# Patient Record
Sex: FEMALE | Race: WHITE | NOT HISPANIC OR LATINO | Employment: FULL TIME | URBAN - METROPOLITAN AREA
[De-identification: names, ages, dates, MRNs, and addresses within clinical notes are randomized per-mention and may not be internally consistent; named-entity substitution may affect disease eponyms.]

---

## 2023-05-16 LAB
ALANINE AMINOTRANSFERASE (SGPT) (U/L) IN SER/PLAS: 26 U/L (ref 7–45)
ALBUMIN (G/DL) IN SER/PLAS: 4.1 G/DL (ref 3.4–5)
ALKALINE PHOSPHATASE (U/L) IN SER/PLAS: 87 U/L (ref 33–136)
ANION GAP IN SER/PLAS: 11 MMOL/L (ref 10–20)
ASPARTATE AMINOTRANSFERASE (SGOT) (U/L) IN SER/PLAS: 25 U/L (ref 9–39)
BILIRUBIN TOTAL (MG/DL) IN SER/PLAS: 0.5 MG/DL (ref 0–1.2)
CALCIUM (MG/DL) IN SER/PLAS: 8.9 MG/DL (ref 8.6–10.3)
CARBON DIOXIDE, TOTAL (MMOL/L) IN SER/PLAS: 29 MMOL/L (ref 21–32)
CHLORIDE (MMOL/L) IN SER/PLAS: 105 MMOL/L (ref 98–107)
CHOLESTEROL (MG/DL) IN SER/PLAS: 174 MG/DL (ref 0–199)
CHOLESTEROL IN HDL (MG/DL) IN SER/PLAS: 62.7 MG/DL
CHOLESTEROL/HDL RATIO: 2.8
CREATININE (MG/DL) IN SER/PLAS: 0.63 MG/DL (ref 0.5–1.05)
GFR FEMALE: >90 ML/MIN/1.73M2
GLUCOSE (MG/DL) IN SER/PLAS: 100 MG/DL (ref 74–99)
LDL: 82 MG/DL (ref 0–99)
POTASSIUM (MMOL/L) IN SER/PLAS: 4.6 MMOL/L (ref 3.5–5.3)
PROTEIN TOTAL: 6.8 G/DL (ref 6.4–8.2)
SODIUM (MMOL/L) IN SER/PLAS: 140 MMOL/L (ref 136–145)
THYROTROPIN (MIU/L) IN SER/PLAS BY DETECTION LIMIT <= 0.05 MIU/L: 2.73 MIU/L (ref 0.44–3.98)
TRIGLYCERIDE (MG/DL) IN SER/PLAS: 148 MG/DL (ref 0–149)
UREA NITROGEN (MG/DL) IN SER/PLAS: 8 MG/DL (ref 6–23)
VLDL: 30 MG/DL (ref 0–40)

## 2023-09-04 LAB — SARS-COV-2 RESULT: DETECTED

## 2023-10-17 PROBLEM — C50.119 MALIGNANT NEOPLASM OF CENTRAL PART OF FEMALE BREAST (MULTI): Status: ACTIVE | Noted: 2023-10-17

## 2023-10-17 PROBLEM — C50.911 BREAST CANCER, RIGHT BREAST (MULTI): Status: ACTIVE | Noted: 2023-10-17

## 2023-10-17 PROBLEM — Z98.890 S/P LYMPH NODE BIOPSY: Status: ACTIVE | Noted: 2023-10-17

## 2023-10-17 PROBLEM — R92.8 ABNORMAL FINDINGS ON DIAGNOSTIC IMAGING OF BREAST: Status: ACTIVE | Noted: 2023-10-17

## 2023-10-17 PROBLEM — Z90.11 HISTORY OF RIGHT TOTAL MASTECTOMY: Status: ACTIVE | Noted: 2023-10-17

## 2023-10-17 PROBLEM — D09.9 CARCINOMA IN SITU: Status: ACTIVE | Noted: 2023-10-17

## 2023-10-17 PROBLEM — N63.10 MASS OF MULTIPLE SITES OF RIGHT BREAST: Status: ACTIVE | Noted: 2023-10-17

## 2023-10-17 PROBLEM — N64.52 DISCHARGE FROM NIPPLE: Status: ACTIVE | Noted: 2023-10-17

## 2023-10-17 PROBLEM — I89.0 LYMPHEDEMA: Status: ACTIVE | Noted: 2023-10-17

## 2023-10-17 RX ORDER — CALCIUM CARBONATE/VITAMIN D3 600MG-5MCG
TABLET ORAL
COMMUNITY
Start: 2018-06-13

## 2023-10-17 RX ORDER — LEVOTHYROXINE SODIUM 100 UG/1
100 TABLET ORAL DAILY
COMMUNITY
Start: 2019-10-02 | End: 2024-01-29

## 2023-10-17 RX ORDER — MULTIVIT-MIN/VIT C/HERB NO.124 250-8.875
TABLET,CHEWABLE ORAL
COMMUNITY
Start: 2021-03-24

## 2023-10-17 RX ORDER — PHENOL 1.4 %
AEROSOL, SPRAY (ML) MUCOUS MEMBRANE
COMMUNITY
Start: 2018-06-13

## 2023-10-17 RX ORDER — FLUTICASONE PROPIONATE 50 MCG
1 SPRAY, SUSPENSION (ML) NASAL EVERY MORNING
COMMUNITY
Start: 2022-11-14

## 2023-10-17 RX ORDER — ATORVASTATIN CALCIUM 10 MG/1
10 TABLET, FILM COATED ORAL DAILY
COMMUNITY
Start: 2021-05-12 | End: 2023-10-18 | Stop reason: SDUPTHER

## 2023-10-17 RX ORDER — CHOLECALCIFEROL (VITAMIN D3) 125 MCG
125 CAPSULE ORAL DAILY
COMMUNITY

## 2023-10-17 RX ORDER — ANASTROZOLE 1 MG/1
1 TABLET ORAL DAILY
COMMUNITY
End: 2023-10-18 | Stop reason: ALTCHOICE

## 2023-10-18 ENCOUNTER — OFFICE VISIT (OUTPATIENT)
Dept: PRIMARY CARE | Facility: CLINIC | Age: 68
End: 2023-10-18
Payer: COMMERCIAL

## 2023-10-18 VITALS
SYSTOLIC BLOOD PRESSURE: 120 MMHG | OXYGEN SATURATION: 96 % | BODY MASS INDEX: 32.95 KG/M2 | HEART RATE: 68 BPM | DIASTOLIC BLOOD PRESSURE: 76 MMHG | WEIGHT: 193 LBS

## 2023-10-18 DIAGNOSIS — E66.09 CLASS 1 OBESITY DUE TO EXCESS CALORIES WITHOUT SERIOUS COMORBIDITY WITH BODY MASS INDEX (BMI) OF 32.0 TO 32.9 IN ADULT: ICD-10-CM

## 2023-10-18 DIAGNOSIS — E03.9 HYPOTHYROIDISM, UNSPECIFIED TYPE: ICD-10-CM

## 2023-10-18 DIAGNOSIS — E78.2 MIXED HYPERLIPIDEMIA: Primary | ICD-10-CM

## 2023-10-18 PROBLEM — E66.811 CLASS 1 OBESITY DUE TO EXCESS CALORIES WITHOUT SERIOUS COMORBIDITY WITH BODY MASS INDEX (BMI) OF 32.0 TO 32.9 IN ADULT: Status: ACTIVE | Noted: 2023-10-18

## 2023-10-18 PROCEDURE — 1159F MED LIST DOCD IN RCRD: CPT | Performed by: INTERNAL MEDICINE

## 2023-10-18 PROCEDURE — 99213 OFFICE O/P EST LOW 20 MIN: CPT | Performed by: INTERNAL MEDICINE

## 2023-10-18 PROCEDURE — 1036F TOBACCO NON-USER: CPT | Performed by: INTERNAL MEDICINE

## 2023-10-18 RX ORDER — ATORVASTATIN CALCIUM 10 MG/1
10 TABLET, FILM COATED ORAL DAILY
Qty: 90 TABLET | Refills: 1 | Status: SHIPPED | OUTPATIENT
Start: 2023-10-18 | End: 2024-05-20

## 2023-10-18 ASSESSMENT — PROMIS GLOBAL HEALTH SCALE
RATE_SOCIAL_SATISFACTION: VERY GOOD
RATE_PHYSICAL_HEALTH: VERY GOOD
RATE_QUALITY_OF_LIFE: VERY GOOD
RATE_GENERAL_HEALTH: VERY GOOD
EMOTIONAL_PROBLEMS: RARELY
CARRYOUT_SOCIAL_ACTIVITIES: EXCELLENT
RATE_AVERAGE_FATIGUE: MILD
CARRYOUT_PHYSICAL_ACTIVITIES: COMPLETELY
RATE_MENTAL_HEALTH: VERY GOOD
RATE_AVERAGE_PAIN: 2

## 2023-10-18 ASSESSMENT — ENCOUNTER SYMPTOMS
LOSS OF SENSATION IN FEET: 0
OCCASIONAL FEELINGS OF UNSTEADINESS: 0
DEPRESSION: 0

## 2023-10-18 NOTE — PROGRESS NOTES
Subjective   Patient ID: Harriet Roth is a 67 y.o. female who presents for Annual Exam.    HPI hyothryoid    Review of Systems    Objective   /76   Pulse 68   Wt 87.5 kg (193 lb)   SpO2 96%   BMI 32.95 kg/m²     Physical Exam  Card rrr  Pulm cta  Abd soft nt bs plus   Assessment/Plan   Diagnoses and all orders for this visit:  Mixed hyperlipidemia  -     atorvastatin (Lipitor) 10 mg tablet; Take 1 tablet (10 mg) by mouth once daily.  Hypothyroidism, unspecified type  -     atorvastatin (Lipitor) 10 mg tablet; Take 1 tablet (10 mg) by mouth once daily.  Class 1 obesity due to excess calories without serious comorbidity with body mass index (BMI) of 32.0 to 32.9 in adult

## 2023-11-20 ENCOUNTER — LAB (OUTPATIENT)
Dept: LAB | Facility: LAB | Age: 68
End: 2023-11-20
Payer: COMMERCIAL

## 2023-11-20 DIAGNOSIS — E03.9 HYPOTHYROIDISM, UNSPECIFIED TYPE: ICD-10-CM

## 2023-11-20 DIAGNOSIS — E78.2 MIXED HYPERLIPIDEMIA: ICD-10-CM

## 2023-11-20 DIAGNOSIS — E66.09 CLASS 1 OBESITY DUE TO EXCESS CALORIES WITHOUT SERIOUS COMORBIDITY WITH BODY MASS INDEX (BMI) OF 32.0 TO 32.9 IN ADULT: ICD-10-CM

## 2023-11-20 LAB
ALBUMIN SERPL BCP-MCNC: 4.2 G/DL (ref 3.4–5)
ALP SERPL-CCNC: 94 U/L (ref 33–136)
ALT SERPL W P-5'-P-CCNC: 23 U/L (ref 7–45)
ANION GAP SERPL CALC-SCNC: 10 MMOL/L (ref 10–20)
AST SERPL W P-5'-P-CCNC: 23 U/L (ref 9–39)
BILIRUB SERPL-MCNC: 0.5 MG/DL (ref 0–1.2)
BUN SERPL-MCNC: 8 MG/DL (ref 6–23)
CALCIUM SERPL-MCNC: 8.7 MG/DL (ref 8.6–10.3)
CHLORIDE SERPL-SCNC: 105 MMOL/L (ref 98–107)
CHOLEST SERPL-MCNC: 171 MG/DL (ref 0–199)
CHOLESTEROL/HDL RATIO: 2.9
CO2 SERPL-SCNC: 29 MMOL/L (ref 21–32)
CREAT SERPL-MCNC: 0.6 MG/DL (ref 0.5–1.05)
GFR SERPL CREATININE-BSD FRML MDRD: >90 ML/MIN/1.73M*2
GLUCOSE SERPL-MCNC: 97 MG/DL (ref 74–99)
HDLC SERPL-MCNC: 58.9 MG/DL
LDLC SERPL CALC-MCNC: 79 MG/DL
NON HDL CHOLESTEROL: 112 MG/DL (ref 0–149)
POTASSIUM SERPL-SCNC: 4.6 MMOL/L (ref 3.5–5.3)
PROT SERPL-MCNC: 6.7 G/DL (ref 6.4–8.2)
SODIUM SERPL-SCNC: 139 MMOL/L (ref 136–145)
TRIGL SERPL-MCNC: 168 MG/DL (ref 0–149)
TSH SERPL-ACNC: 1.43 MIU/L (ref 0.44–3.98)
VLDL: 34 MG/DL (ref 0–40)

## 2023-11-20 PROCEDURE — 84443 ASSAY THYROID STIM HORMONE: CPT

## 2023-11-20 PROCEDURE — 80053 COMPREHEN METABOLIC PANEL: CPT

## 2023-11-20 PROCEDURE — 36415 COLL VENOUS BLD VENIPUNCTURE: CPT

## 2023-11-20 PROCEDURE — 80061 LIPID PANEL: CPT

## 2024-01-29 DIAGNOSIS — E03.9 HYPOTHYROIDISM, UNSPECIFIED TYPE: Primary | ICD-10-CM

## 2024-01-29 RX ORDER — LEVOTHYROXINE SODIUM 100 UG/1
100 TABLET ORAL DAILY
Qty: 90 TABLET | Refills: 1 | Status: SHIPPED | OUTPATIENT
Start: 2024-01-29 | End: 2024-04-24 | Stop reason: SDUPTHER

## 2024-04-04 NOTE — PROGRESS NOTES
Harriet Roth female   1955 68 y.o.   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 2017 Betina Elizondo performed right simple mastectomy and sentinel lymph node biopsy. Final pathology revealed papillary carcinoma and ductal carcinoma in situ (DCIS), multiple foci of invasive carcinoma were arising in association with papillary carcinoma, total extent measuring 4cm, margins negative, sentinel lymph nodes, negative (0/6). Postoperative radiation was not recommended. Anti-HER2 therapy was not recommended based on HER2 3+ only present in the papillary component. 2017 Anastrazole was initiated under the direction of Amandeep Moreno with a five year plan. She completed in 2022 with five years of therapy. She presents today for annual mammogram and exam with no complaints. She denies any new masses or lumps. She is now working at [employer] since [employer] closed. She is retiring 2024. She is very excited. She is taking a trip to [place] with her family in 2024.  Stage [id number]      BREAST IMAGIN/3/2023 Left screening mammogram, indicates BI-RADS Category 1.      FEMALE HISTORY: menarche age 14, , first birth age 35,  x1 month, OCP's x 4 years, menopause age 56, no HRT, heterogeneously dense tissue     FAMILY CANCER HISTORY:   Father: Prostate cancer, 65      Surgical History  She has a past surgical history that includes Tonsillectomy (2019) and Other surgical history (2019).     Social History  She reports that she has never smoked. She has never used smokeless tobacco. She reports that she does not currently use alcohol. She reports that she does not use drugs.    Family History  Family  History   Problem Relation Name Age of Onset    Prostate cancer Father          Allergies  Patient has no known allergies.    Medications  Current Outpatient Medications   Medication Instructions    atorvastatin (LIPITOR) 10 mg, oral, Daily    calcium carbonate-vitamin D3 600 mg-5 mcg (200 unit) tablet     cholecalciferol (VITAMIN D-3) 125 mcg, oral, Daily    cyanocobalamin, vitamin B-12, (VITAMIN B-12 ORAL) oral    ELDERBERRY FRUIT ORAL oral    fluticasone (Flonase) 50 mcg/actuation nasal spray 1 spray, Each Nostril, Every morning    levothyroxine (SYNTHROID, LEVOXYL) 100 mcg, oral, Daily    multivit-min-vit C-herb no.124 (Airborne, ascorbic acid,) 250-8.875 mg tablet,chewable Airborne Oral Tablet Chewable   Refills: 0        Start : 24-Mar-2021   Active    multivitamin with minerals (Adults Multivitamin) tablet Multivitamin Adults Oral Tablet   Refills: 0        Start : 13-Jun-2018   Active    mv-min/vit C/glut/lysine/hb124 (IMMUNE SUPPORT ORAL) oral         REVIEW OF SYSTEMS    Constitutional:  Negative for appetite change, fatigue, fever and unexpected weight change.   HENT:  Negative for ear pain, hearing loss, nosebleeds, sore throat and trouble swallowing.    Eyes:  Negative for discharge, itching and visual disturbance.   Respiratory:  Negative for cough, chest tightness and shortness of breath.    Cardiovascular:  Negative for chest pain, palpitations and leg swelling.   Breast: as indicated in HPI  Gastrointestinal:  Negative for abdominal pain, constipation, diarrhea and nausea.   Endocrine: Negative for cold intolerance and heat intolerance.   Genitourinary:  Negative for dysuria, frequency, hematuria, pelvic pain and vaginal bleeding.   Musculoskeletal:  Negative for arthralgias, back pain, gait problem, joint swelling and myalgias.   Skin:  Negative for color change and rash.   Allergic/Immunologic: Negative for environmental allergies and food allergies.   Neurological:  Negative for dizziness,  tremors, speech difficulty, weakness, numbness and headaches.   Hematological:  Does not bruise/bleed easily.   Psychiatric/Behavioral:  Negative for agitation, dysphoric mood and sleep disturbance. The patient is not nervous/anxious.         Past Medical History  She has a past medical history of Elevated blood-pressure reading, without diagnosis of hypertension (12/04/2019), Inflammatory disease of cervix uteri (06/12/2019), Mild cervical dysplasia (12/04/2019), and Personal history of other endocrine, nutritional and metabolic disease (12/04/2019).     Physical Exam  Patient is alert and oriented x3 and in a relaxed and appropriate mood. Her gait is steady and hand grasps are equal. Sclera is clear. The right breast and nipple have been removed with a well-healed transverse mastectomy incision. The right axilla has a well-healed biopsy incision. The overlying tissue is soft without palpable abnormalities, discrete nodules or masses. The left breast tissue is soft without palpable abnormalities, discrete nodules or masses. The skin and left nipple appear normal. There is no cervical, supraclavicular or axillary lymphadenopathy. Heart rate and rhythm normal, S1 and S2 appreciated. The lungs are clear to auscultation bilaterally. Abdomen is soft and non-tender.       Physical Exam  Chest:              Last Recorded Vitals  Vitals:    04/15/24 1344   BP: 142/85   Pulse: 82       Relevant Results   Time was spent viewing digital images of the radiology testing with the patient. I explained the results in depth, along with suggested explanation for follow up recommendations based on the testing results. BI-RADS Category 1    Imaging    Narrative & Impression   Interpreted By:  Gigi Jenkins,   STUDY:  BI MAMMO LEFT DIAGNOSTIC TOMOSYNTHESIS;  4/15/2024 1:49 pm      ACCESSION NUMBER(S):  [id number]      ORDERING CLINICIAN:  EDUARDO NAIK      INDICATION:  Annual left screening mammogram. History of right mastectomy.       COMPARISON:  04/03/2023, 03/29/2022, 03/24/2021      FINDINGS:  2D and tomosynthesis images were reviewed at 1 mm slice thickness.      Density:  The breast tissue is heterogeneously dense, which may  obscure small masses.      Stable benign asymmetry in the central left breast at middle depth on  CC view. No suspicious masses or calcifications are identified in the  left breast.      IMPRESSION:  No mammographic evidence of malignancy in the left breast.      BI-RADS CATEGORY:      BI-RADS Category:  1 Negative.  Recommendation:  Annual Screening.  Recommended Date:  1 Year.  Laterality:  Left.     Assessment/Plan   Normal clinical exam and imaging, history of right invasive cancer, no evidence of recurrence, s/p right mastectomy without reconstruction, Anastrazole endocrine therapy completed, no family history of breast cancer, heterogeneously dense tissue     Plan: Return in one year for left screening mammogram and office visit. Offered prescription for breast prosthesis and bra, declines.     Patient Discussion/Summary  Your clinical examination and imaging are normal. Please return in one year for left screening mammogram and office visit or sooner if you have any problems or concerns.     You can see your health information, review clinical summaries from office visits & test results online when you follow your health with MY  Chart, a personal health record. To sign up go to [email or web address]. If you need assistance with signing up or trouble getting into your account call [facility] Patient Line 24/7 at [phone].    My office phone number is [phone] if you need to get in touch with me or have additional questions or concerns. Thank you for choosing [facility] and trusting me as your healthcare provider. I look forward to seeing you again at your next office visit. I am honored to be a provider on your health care team and I remain dedicated to helping you achieve your health  goals.      Amara Worley, APRN-CNP

## 2024-04-08 ENCOUNTER — APPOINTMENT (OUTPATIENT)
Dept: RADIOLOGY | Facility: CLINIC | Age: 69
End: 2024-04-08
Payer: COMMERCIAL

## 2024-04-08 ENCOUNTER — APPOINTMENT (OUTPATIENT)
Dept: SURGICAL ONCOLOGY | Facility: CLINIC | Age: 69
End: 2024-04-08
Payer: COMMERCIAL

## 2024-04-15 ENCOUNTER — OFFICE VISIT (OUTPATIENT)
Dept: SURGICAL ONCOLOGY | Facility: CLINIC | Age: 69
End: 2024-04-15
Payer: COMMERCIAL

## 2024-04-15 ENCOUNTER — HOSPITAL ENCOUNTER (OUTPATIENT)
Dept: RADIOLOGY | Facility: CLINIC | Age: 69
Discharge: HOME | End: 2024-04-15
Payer: COMMERCIAL

## 2024-04-15 VITALS — WEIGHT: 192.9 LBS | HEIGHT: 64 IN | BODY MASS INDEX: 32.93 KG/M2

## 2024-04-15 VITALS
HEIGHT: 64 IN | SYSTOLIC BLOOD PRESSURE: 142 MMHG | HEART RATE: 82 BPM | DIASTOLIC BLOOD PRESSURE: 85 MMHG | WEIGHT: 196.8 LBS | BODY MASS INDEX: 33.6 KG/M2

## 2024-04-15 DIAGNOSIS — Z85.3 ENCOUNTER FOR FOLLOW-UP SURVEILLANCE OF BREAST CANCER: Primary | ICD-10-CM

## 2024-04-15 DIAGNOSIS — R92.8 OTHER ABNORMAL AND INCONCLUSIVE FINDINGS ON DIAGNOSTIC IMAGING OF BREAST: ICD-10-CM

## 2024-04-15 DIAGNOSIS — Z08 ENCOUNTER FOR FOLLOW-UP SURVEILLANCE OF BREAST CANCER: Primary | ICD-10-CM

## 2024-04-15 PROCEDURE — G0279 TOMOSYNTHESIS, MAMMO: HCPCS | Mod: LEFT SIDE | Performed by: STUDENT IN AN ORGANIZED HEALTH CARE EDUCATION/TRAINING PROGRAM

## 2024-04-15 PROCEDURE — 99213 OFFICE O/P EST LOW 20 MIN: CPT | Performed by: NURSE PRACTITIONER

## 2024-04-15 PROCEDURE — 77065 DX MAMMO INCL CAD UNI: CPT | Mod: LEFT SIDE | Performed by: STUDENT IN AN ORGANIZED HEALTH CARE EDUCATION/TRAINING PROGRAM

## 2024-04-15 PROCEDURE — 1126F AMNT PAIN NOTED NONE PRSNT: CPT | Performed by: NURSE PRACTITIONER

## 2024-04-15 PROCEDURE — 1159F MED LIST DOCD IN RCRD: CPT | Performed by: NURSE PRACTITIONER

## 2024-04-15 PROCEDURE — 77061 BREAST TOMOSYNTHESIS UNI: CPT | Mod: LT

## 2024-04-15 PROCEDURE — 3008F BODY MASS INDEX DOCD: CPT | Performed by: NURSE PRACTITIONER

## 2024-04-15 PROCEDURE — 1036F TOBACCO NON-USER: CPT | Performed by: NURSE PRACTITIONER

## 2024-04-15 ASSESSMENT — PAIN SCALES - GENERAL: PAINLEVEL: 0-NO PAIN

## 2024-04-15 NOTE — PATIENT INSTRUCTIONS
Your clinical examination and imaging are normal. Please return in one year for left screening mammogram and office visit or sooner if you have any problems or concerns.     You can see your health information, review clinical summaries from office visits & test results online when you follow your health with MY  Chart, a personal health record. To sign up go to [email or web address]. If you need assistance with signing up or trouble getting into your account call [facility] Patient Line 24/7 at [phone].    My office phone number is [phone] if you need to get in touch with me or have additional questions or concerns. Thank you for choosing [facility] and trusting me as your healthcare provider. I look forward to seeing you again at your next office visit. I am honored to be a provider on your health care team and I remain dedicated to helping you achieve your health goals.

## 2024-04-23 ASSESSMENT — PROMIS GLOBAL HEALTH SCALE
CARRYOUT_SOCIAL_ACTIVITIES: EXCELLENT
RATE_PHYSICAL_HEALTH: GOOD
CARRYOUT_PHYSICAL_ACTIVITIES: COMPLETELY
RATE_MENTAL_HEALTH: VERY GOOD
RATE_SOCIAL_SATISFACTION: GOOD
EMOTIONAL_PROBLEMS: RARELY
RATE_QUALITY_OF_LIFE: VERY GOOD
RATE_GENERAL_HEALTH: GOOD
RATE_AVERAGE_PAIN: 2

## 2024-04-24 ENCOUNTER — OFFICE VISIT (OUTPATIENT)
Dept: PRIMARY CARE | Facility: CLINIC | Age: 69
End: 2024-04-24
Payer: COMMERCIAL

## 2024-04-24 VITALS
WEIGHT: 198.2 LBS | OXYGEN SATURATION: 96 % | HEIGHT: 64 IN | TEMPERATURE: 97.3 F | DIASTOLIC BLOOD PRESSURE: 62 MMHG | HEART RATE: 84 BPM | BODY MASS INDEX: 33.84 KG/M2 | SYSTOLIC BLOOD PRESSURE: 120 MMHG

## 2024-04-24 DIAGNOSIS — E66.09 CLASS 1 OBESITY DUE TO EXCESS CALORIES WITHOUT SERIOUS COMORBIDITY WITH BODY MASS INDEX (BMI) OF 32.0 TO 32.9 IN ADULT: ICD-10-CM

## 2024-04-24 DIAGNOSIS — Z78.0 MENOPAUSE: Primary | ICD-10-CM

## 2024-04-24 DIAGNOSIS — Z00.00 WELLNESS EXAMINATION: ICD-10-CM

## 2024-04-24 DIAGNOSIS — C50.911 MALIGNANT NEOPLASM OF RIGHT FEMALE BREAST, UNSPECIFIED ESTROGEN RECEPTOR STATUS, UNSPECIFIED SITE OF BREAST (MULTI): ICD-10-CM

## 2024-04-24 DIAGNOSIS — I89.0 LYMPHEDEMA: ICD-10-CM

## 2024-04-24 DIAGNOSIS — E03.9 HYPOTHYROIDISM, UNSPECIFIED TYPE: ICD-10-CM

## 2024-04-24 PROCEDURE — 1036F TOBACCO NON-USER: CPT | Performed by: INTERNAL MEDICINE

## 2024-04-24 PROCEDURE — 1160F RVW MEDS BY RX/DR IN RCRD: CPT | Performed by: INTERNAL MEDICINE

## 2024-04-24 PROCEDURE — 99215 OFFICE O/P EST HI 40 MIN: CPT | Performed by: INTERNAL MEDICINE

## 2024-04-24 PROCEDURE — 1159F MED LIST DOCD IN RCRD: CPT | Performed by: INTERNAL MEDICINE

## 2024-04-24 PROCEDURE — 3008F BODY MASS INDEX DOCD: CPT | Performed by: INTERNAL MEDICINE

## 2024-04-24 RX ORDER — LEVOTHYROXINE SODIUM 100 UG/1
100 TABLET ORAL DAILY
Qty: 90 TABLET | Refills: 1 | Status: SHIPPED | OUTPATIENT
Start: 2024-04-24

## 2024-04-24 ASSESSMENT — ENCOUNTER SYMPTOMS
ARTHRALGIAS: 0
EYE REDNESS: 0
WHEEZING: 0
APPETITE CHANGE: 0
COUGH: 0
STRIDOR: 0
FACIAL ASYMMETRY: 0
EYE PAIN: 0
FATIGUE: 0
CHILLS: 0
PHOTOPHOBIA: 0
SHORTNESS OF BREATH: 0
ACTIVITY CHANGE: 0
CHOKING: 0
DIAPHORESIS: 0
CHEST TIGHTNESS: 0
LIGHT-HEADEDNESS: 0
HEMATOLOGIC/LYMPHATIC NEGATIVE: 1
ENDOCRINE NEGATIVE: 1
HEADACHES: 0
NUMBNESS: 0
UNEXPECTED WEIGHT CHANGE: 0
PALPITATIONS: 0
PSYCHIATRIC NEGATIVE: 1
ALLERGIC/IMMUNOLOGIC NEGATIVE: 1
EYE DISCHARGE: 0
EYE ITCHING: 0
DIZZINESS: 0
APNEA: 0
GASTROINTESTINAL NEGATIVE: 1
FEVER: 0

## 2024-04-24 NOTE — PROGRESS NOTES
"Subjective   Patient ID: Harriet Roth is a 68 y.o. female who presents for Follow-up (6 months).    HPI doing well   Feels well  Health is ok   Some fatigue   Diet is good   No other issues noted   Breathing is good     Colon  ? 2025 next   Mamm 2024   Dexa   due   Pap due     Review of Systems   Constitutional:  Negative for activity change, appetite change, chills, diaphoresis, fatigue, fever and unexpected weight change.   HENT: Negative.     Eyes:  Negative for photophobia, pain, discharge, redness, itching and visual disturbance.   Respiratory:  Negative for apnea, cough, choking, chest tightness, shortness of breath, wheezing and stridor.    Cardiovascular:  Negative for chest pain, palpitations and leg swelling.   Gastrointestinal: Negative.    Endocrine: Negative.    Genitourinary: Negative.    Musculoskeletal:  Negative for arthralgias.   Skin: Negative.    Allergic/Immunologic: Negative.    Neurological:  Negative for dizziness, facial asymmetry, light-headedness, numbness and headaches.   Hematological: Negative.    Psychiatric/Behavioral: Negative.         Objective   /62 (BP Location: Left arm, Patient Position: Sitting, BP Cuff Size: Adult)   Pulse 84   Temp 36.3 °C (97.3 °F) (Temporal)   Ht 1.613 m (5' 3.5\")   Wt 89.9 kg (198 lb 3.2 oz)   SpO2 96%   BMI 34.55 kg/m²     Physical Exam  HENT:      Head: Normocephalic.      Right Ear: Tympanic membrane normal.      Nose: Nose normal.      Mouth/Throat:      Mouth: Mucous membranes are moist.   Eyes:      Pupils: Pupils are equal, round, and reactive to light.   Cardiovascular:      Rate and Rhythm: Normal rate and regular rhythm.   Pulmonary:      Effort: Pulmonary effort is normal.   Abdominal:      General: Abdomen is flat. Bowel sounds are normal.   Musculoskeletal:         General: Normal range of motion.   Skin:     General: Skin is warm.      Capillary Refill: Capillary refill takes less than 2 seconds.   Neurological:      General: No " focal deficit present.      Mental Status: She is alert.   Psychiatric:         Behavior: Behavior normal.       Assessment/Plan   Diagnoses and all orders for this visit:  Menopause  -     XR DEXA bone density; Future  -     Urinalysis with Reflex Culture and Microscopic; Standing  Wellness examination  -     CBC; Future  -     Lipid Panel; Future  -     Comprehensive Metabolic Panel; Future  -     Urinalysis with Reflex Culture and Microscopic; Standing  Hypothyroidism, unspecified type  Comments:  good on meds  Orders:  -     Urinalysis with Reflex Culture and Microscopic; Standing  -     TSH with reflex to Free T4 if abnormal; Future  -     levothyroxine (Synthroid, Levoxyl) 100 mcg tablet; Take 1 tablet (100 mcg) by mouth once daily.  Class 1 obesity due to excess calories without serious comorbidity with body mass index (BMI) of 32.0 to 32.9 in adult  Comments:  trying to diet  Lymphedema  Malignant neoplasm of right female breast, unspecified estrogen receptor status, unspecified site of breast (Multi)  Comments:  all stable  Other orders  -     Follow Up In Primary Care - Established

## 2024-05-10 ENCOUNTER — LAB (OUTPATIENT)
Dept: LAB | Facility: LAB | Age: 69
End: 2024-05-10
Payer: COMMERCIAL

## 2024-05-10 DIAGNOSIS — Z78.0 MENOPAUSE: ICD-10-CM

## 2024-05-10 DIAGNOSIS — E03.9 HYPOTHYROIDISM, UNSPECIFIED TYPE: ICD-10-CM

## 2024-05-10 DIAGNOSIS — Z00.00 WELLNESS EXAMINATION: ICD-10-CM

## 2024-05-10 LAB
ALBUMIN SERPL BCP-MCNC: 4.3 G/DL (ref 3.4–5)
ALP SERPL-CCNC: 85 U/L (ref 33–136)
ALT SERPL W P-5'-P-CCNC: 26 U/L (ref 7–45)
ANION GAP SERPL CALC-SCNC: 8 MMOL/L (ref 10–20)
APPEARANCE UR: CLEAR
AST SERPL W P-5'-P-CCNC: 22 U/L (ref 9–39)
BILIRUB SERPL-MCNC: 0.4 MG/DL (ref 0–1.2)
BILIRUB UR STRIP.AUTO-MCNC: NEGATIVE MG/DL
BUN SERPL-MCNC: 8 MG/DL (ref 6–23)
CALCIUM SERPL-MCNC: 8.9 MG/DL (ref 8.6–10.3)
CHLORIDE SERPL-SCNC: 104 MMOL/L (ref 98–107)
CHOLEST SERPL-MCNC: 177 MG/DL (ref 0–199)
CHOLESTEROL/HDL RATIO: 2.9
CO2 SERPL-SCNC: 30 MMOL/L (ref 21–32)
COLOR UR: NORMAL
CREAT SERPL-MCNC: 0.61 MG/DL (ref 0.5–1.05)
EGFRCR SERPLBLD CKD-EPI 2021: >90 ML/MIN/1.73M*2
ERYTHROCYTE [DISTWIDTH] IN BLOOD BY AUTOMATED COUNT: 12.1 % (ref 11.5–14.5)
GLUCOSE SERPL-MCNC: 104 MG/DL (ref 74–99)
GLUCOSE UR STRIP.AUTO-MCNC: NORMAL MG/DL
HCT VFR BLD AUTO: 44 % (ref 36–46)
HDLC SERPL-MCNC: 60.1 MG/DL
HGB BLD-MCNC: 14.3 G/DL (ref 12–16)
HOLD SPECIMEN: NORMAL
KETONES UR STRIP.AUTO-MCNC: NEGATIVE MG/DL
LDLC SERPL CALC-MCNC: 85 MG/DL
LEUKOCYTE ESTERASE UR QL STRIP.AUTO: NEGATIVE
MCH RBC QN AUTO: 29.9 PG (ref 26–34)
MCHC RBC AUTO-ENTMCNC: 32.5 G/DL (ref 32–36)
MCV RBC AUTO: 92 FL (ref 80–100)
NITRITE UR QL STRIP.AUTO: NEGATIVE
NON HDL CHOLESTEROL: 117 MG/DL (ref 0–149)
NRBC BLD-RTO: 0 /100 WBCS (ref 0–0)
PH UR STRIP.AUTO: 7.5 [PH]
PLATELET # BLD AUTO: 265 X10*3/UL (ref 150–450)
POTASSIUM SERPL-SCNC: 4.3 MMOL/L (ref 3.5–5.3)
PROT SERPL-MCNC: 6.9 G/DL (ref 6.4–8.2)
PROT UR STRIP.AUTO-MCNC: NEGATIVE MG/DL
RBC # BLD AUTO: 4.79 X10*6/UL (ref 4–5.2)
RBC # UR STRIP.AUTO: NEGATIVE /UL
SODIUM SERPL-SCNC: 138 MMOL/L (ref 136–145)
SP GR UR STRIP.AUTO: 1.01
TRIGL SERPL-MCNC: 162 MG/DL (ref 0–149)
TSH SERPL-ACNC: 3.4 MIU/L (ref 0.44–3.98)
UROBILINOGEN UR STRIP.AUTO-MCNC: NORMAL MG/DL
VLDL: 32 MG/DL (ref 0–40)
WBC # BLD AUTO: 4.9 X10*3/UL (ref 4.4–11.3)

## 2024-05-10 PROCEDURE — 85027 COMPLETE CBC AUTOMATED: CPT

## 2024-05-10 PROCEDURE — 80053 COMPREHEN METABOLIC PANEL: CPT

## 2024-05-10 PROCEDURE — 81003 URINALYSIS AUTO W/O SCOPE: CPT

## 2024-05-10 PROCEDURE — 36415 COLL VENOUS BLD VENIPUNCTURE: CPT

## 2024-05-10 PROCEDURE — 80061 LIPID PANEL: CPT

## 2024-05-10 PROCEDURE — 84443 ASSAY THYROID STIM HORMONE: CPT

## 2024-05-20 DIAGNOSIS — E78.2 MIXED HYPERLIPIDEMIA: ICD-10-CM

## 2024-05-20 DIAGNOSIS — E03.9 HYPOTHYROIDISM, UNSPECIFIED TYPE: ICD-10-CM

## 2024-05-20 RX ORDER — ATORVASTATIN CALCIUM 10 MG/1
10 TABLET, FILM COATED ORAL DAILY
Qty: 90 TABLET | Refills: 1 | Status: SHIPPED | OUTPATIENT
Start: 2024-05-20

## 2024-05-31 ENCOUNTER — PROCEDURE VISIT (OUTPATIENT)
Dept: PRIMARY CARE | Facility: CLINIC | Age: 69
End: 2024-05-31
Payer: COMMERCIAL

## 2024-05-31 VITALS
WEIGHT: 198 LBS | BODY MASS INDEX: 33.8 KG/M2 | DIASTOLIC BLOOD PRESSURE: 88 MMHG | RESPIRATION RATE: 16 BRPM | HEART RATE: 77 BPM | HEIGHT: 64 IN | SYSTOLIC BLOOD PRESSURE: 149 MMHG

## 2024-05-31 DIAGNOSIS — Z01.419 WELL WOMAN EXAM: ICD-10-CM

## 2024-05-31 DIAGNOSIS — N84.1 CERVICAL POLYP: Primary | ICD-10-CM

## 2024-05-31 DIAGNOSIS — C50.911 MALIGNANT NEOPLASM OF RIGHT FEMALE BREAST, UNSPECIFIED ESTROGEN RECEPTOR STATUS, UNSPECIFIED SITE OF BREAST (MULTI): ICD-10-CM

## 2024-05-31 PROCEDURE — 99214 OFFICE O/P EST MOD 30 MIN: CPT | Performed by: NURSE PRACTITIONER

## 2024-05-31 NOTE — PROGRESS NOTES
"Primary Care Physician: Amandeep Barrera MD    Date of Visit: 05/31/2024     Chief Complaint:   Harriet Roth is here for her gyn wellness exam.     Hx:  Kids 2 - vaginally, no grandkids  - not active   Lifetime partner -  1   Colonoscopy - every 2 yrs for polyps with dr hodge    No surgeries  Last period/menopause 50's no bleeding since  Has a breast health specialist APN at  [facility] she does her q6mo breast exams and orders  her mammos for her and reviews.      Sbe all the time she reports   Breast cancer, mom had partial hysterectomy ? Reason     Past Medical History:  Past Medical History:   Diagnosis Date    Elevated blood-pressure reading, without diagnosis of hypertension 12/04/2019    Borderline hypertension    Inflammatory disease of cervix uteri 06/12/2019    Chronic cervicitis    Mild cervical dysplasia 12/04/2019    Mild cervical dysplasia    Personal history of other endocrine, nutritional and metabolic disease 12/04/2019    History of hypothyroidism        Social History:   reports that she has never smoked. She has never used smokeless tobacco. She reports that she does not currently use alcohol. She reports that she does not use drugs.     Surgical History:  Past Surgical History:   Procedure Laterality Date    OTHER SURGICAL HISTORY  12/04/2019    Right mastectomy    TONSILLECTOMY  12/04/2019    Tonsillectomy        Family History:  family history includes HYSTERECTOMY PARTIAL in her mother; Prostate cancer in her father.      HPI:  HPI  above  ROS:   Review of Systems   neg  Vitals:  /88   Pulse 77   Resp 16   Ht 1.613 m (5' 3.5\")   Wt 89.8 kg (198 lb)   BMI 34.52 kg/m²   Nerves last b/p with pcp normal    GYN Physical Exam:  Physical Exam  Constitutional:       General: She is awake.      Appearance: Normal appearance.   Genitourinary:      Rectum and urethral meatus normal.      Genitourinary Comments: Pt has a cervical polyp covering 1/4 her cervical os.  We will refer to " gyn for eval/removal.       Right Labia: No rash, tenderness, lesions or skin changes.     Left Labia: No tenderness, lesions, skin changes or rash.     No vaginal discharge, erythema, tenderness, bleeding, ulceration or granulation tissue.        Right Adnexa: not tender, not full and no mass present.     Left Adnexa: not tender, not full and no mass present.     Cervical friability and polyp present.            No uterine mass detected.  Neurological:      Mental Status: She is alert.   Psychiatric:         Behavior: Behavior is cooperative.   Vitals and nursing note reviewed.         Has a breast health provider so no breast exam was done today      Assessment/Plan   Problem List Items Addressed This Visit       Breast cancer, right breast (Multi)     Other Visit Diagnoses       Cervical polyp    -  Primary    Relevant Orders    Referral to Gynecology    Well woman exam        Relevant Orders    THINPREP PAP TEST              Laura L Seaver, APRN-CNP

## 2024-06-10 ENCOUNTER — OFFICE VISIT (OUTPATIENT)
Dept: OBSTETRICS AND GYNECOLOGY | Facility: CLINIC | Age: 69
End: 2024-06-10
Payer: MEDICARE

## 2024-06-10 VITALS
WEIGHT: 196 LBS | HEIGHT: 64 IN | BODY MASS INDEX: 33.46 KG/M2 | DIASTOLIC BLOOD PRESSURE: 80 MMHG | SYSTOLIC BLOOD PRESSURE: 140 MMHG

## 2024-06-10 DIAGNOSIS — N84.1 CERVICAL POLYP: ICD-10-CM

## 2024-06-10 PROCEDURE — 3008F BODY MASS INDEX DOCD: CPT | Performed by: OBSTETRICS & GYNECOLOGY

## 2024-06-10 PROCEDURE — 1036F TOBACCO NON-USER: CPT | Performed by: OBSTETRICS & GYNECOLOGY

## 2024-06-10 PROCEDURE — 1159F MED LIST DOCD IN RCRD: CPT | Performed by: OBSTETRICS & GYNECOLOGY

## 2024-06-10 PROCEDURE — 1160F RVW MEDS BY RX/DR IN RCRD: CPT | Performed by: OBSTETRICS & GYNECOLOGY

## 2024-06-10 PROCEDURE — 99203 OFFICE O/P NEW LOW 30 MIN: CPT | Performed by: OBSTETRICS & GYNECOLOGY

## 2024-06-10 NOTE — ASSESSMENT & PLAN NOTE
-- CERVICAL POLYP: Patient referred for cervical polyp.  On exam a mildly irregular cervical canal was noted however no cervical polyp was visualized.  Patient is menopausal and denies any PMB.  Patient had Pap performed May 29 with results pending.  Discussed with patient normal exam.  Would recommend observation, and if abnormal Pap patient will call for a colposcopy.  -- History of BREAST CANCER: In 2017, in remission, normal mammogram April 2024  --Pap from May 2024 is pending  --Patient is P2    PLAN:  Follow-up if any problems or abnormal Pap

## 2024-06-10 NOTE — PROGRESS NOTES
Subjective   Patient ID: Harriet Roth is a 68 y.o. female who presents for Referral (Referral from PCP,CNP.  Cervical polyp.).  HPI  68-year-old referred for possible cervical polyp.  Patient denies any postmenopausal bleeding.  She had a Pap performed May 2019 and is pending.  Patient denies any GYN concerns.        Objective   Physical Exam  Exam conducted with a chaperone present.   Constitutional:       Appearance: Normal appearance.   Abdominal:      Palpations: Abdomen is soft. There is no mass.      Tenderness: There is no abdominal tenderness.   Genitourinary:     General: Normal vulva.      Vagina: Normal. No lesions.      Cervix: No lesion.   Neurological:      Mental Status: She is alert.   Psychiatric:         Mood and Affect: Mood normal.         Behavior: Behavior normal.         Assessment/Plan   Problem List Items Addressed This Visit             ICD-10-CM    Cervical polyp N84.1     -- CERVICAL POLYP: Patient referred for cervical polyp.  On exam a mildly irregular cervical canal was noted however no cervical polyp was visualized.  Patient is menopausal and denies any PMB.  Patient had Pap performed May 29 with results pending.  Discussed with patient normal exam.  Would recommend observation, and if abnormal Pap patient will call for a colposcopy.  -- History of BREAST CANCER: In 2017, in remission, normal mammogram April 2024  --Pap from May 2024 is pending  --Patient is P2    PLAN:  Follow-up if any problems or abnormal Pap

## 2024-06-13 LAB
CYTOLOGY CMNT CVX/VAG CYTO-IMP: NORMAL
LAB AP HPV GENOTYPE QUESTION: NO
LAB AP HPV HR: NORMAL
LABORATORY COMMENT REPORT: NORMAL
MENSTRUAL HX REPORTED: NORMAL
PATH REPORT.TOTAL CANCER: NORMAL

## 2024-07-18 ENCOUNTER — OFFICE VISIT (OUTPATIENT)
Dept: PRIMARY CARE | Facility: CLINIC | Age: 69
End: 2024-07-18
Payer: MEDICARE

## 2024-07-18 VITALS
BODY MASS INDEX: 33.97 KG/M2 | HEART RATE: 100 BPM | OXYGEN SATURATION: 97 % | DIASTOLIC BLOOD PRESSURE: 80 MMHG | WEIGHT: 199 LBS | HEIGHT: 64 IN | SYSTOLIC BLOOD PRESSURE: 142 MMHG

## 2024-07-18 DIAGNOSIS — L23.7 POISON IVY DERMATITIS: Primary | ICD-10-CM

## 2024-07-18 PROCEDURE — 99213 OFFICE O/P EST LOW 20 MIN: CPT | Performed by: INTERNAL MEDICINE

## 2024-07-18 PROCEDURE — 1036F TOBACCO NON-USER: CPT | Performed by: INTERNAL MEDICINE

## 2024-07-18 PROCEDURE — 1159F MED LIST DOCD IN RCRD: CPT | Performed by: INTERNAL MEDICINE

## 2024-07-18 PROCEDURE — 3008F BODY MASS INDEX DOCD: CPT | Performed by: INTERNAL MEDICINE

## 2024-07-18 RX ORDER — TRIAMCINOLONE ACETONIDE 40 MG/ML
40 INJECTION, SUSPENSION INTRA-ARTICULAR; INTRAMUSCULAR ONCE
Status: SHIPPED | OUTPATIENT
Start: 2024-07-18

## 2024-07-18 RX ORDER — METHYLPREDNISOLONE 4 MG/1
TABLET ORAL
Qty: 21 TABLET | Refills: 0 | Status: SHIPPED | OUTPATIENT
Start: 2024-07-18 | End: 2024-07-25

## 2024-07-18 ASSESSMENT — ENCOUNTER SYMPTOMS
ALLERGIC REACTION: 1
COUGH: 1

## 2024-07-18 NOTE — PROGRESS NOTES
"Subjective   Patient ID: Harriet Roth is a 68 y.o. female who presents for Allergic Reaction.    Allergic Reaction  Associated symptoms include coughing and a rash.   Rash  This is a new problem. The current episode started in the past 7 days. The problem has been gradually worsening since onset. The affected locations include the face, neck, chest, back and abdomen. It is unknown if there was an exposure to a precipitant. Associated symptoms include coughing and facial edema.      She remembers working out in yard over weekend, developed diffuse itchy rash over neck, torso and both upper extremities , and also swelling around right eye. She denies fever, chills, lip or tongue swelling, dyspnea or wheezing.  She has treid benadryl tab and HC cream.  No known environmental allergy , chemical , food allergy, no new med/otc or antibiotic  taken recently.    Review of Systems   Respiratory:  Positive for cough.    Skin:  Positive for rash.     See HPI    Objective   /80 (BP Location: Left arm, Patient Position: Sitting)   Pulse 100   Ht 1.626 m (5' 4\")   Wt 90.3 kg (199 lb)   SpO2 97%   BMI 34.16 kg/m²     Physical Exam  Constitutional:       Appearance: She is obese.   Skin:     General: Skin is warm and dry.      Findings: Rash present.      Comments: Diffuse pinkish erythematous rash over neck , groin, abdomen, back and both upper extremities , and right eyelid swelling   Neurological:      Mental Status: She is alert.         Assessment/Plan        Poison Ivy contact dermatitis : diffuse  Kenalog 40 mg IM  Medrol dosepak from tomorrow  Hydration, tylenol  HC cream, Benadryl tab  Contact precautions    "

## 2024-09-18 ENCOUNTER — APPOINTMENT (OUTPATIENT)
Dept: HEMATOLOGY/ONCOLOGY | Facility: CLINIC | Age: 69
End: 2024-09-18
Payer: COMMERCIAL

## 2024-09-18 ENCOUNTER — OFFICE VISIT (OUTPATIENT)
Dept: HEMATOLOGY/ONCOLOGY | Facility: CLINIC | Age: 69
End: 2024-09-18
Payer: MEDICARE

## 2024-09-18 VITALS
SYSTOLIC BLOOD PRESSURE: 131 MMHG | WEIGHT: 200.84 LBS | HEART RATE: 78 BPM | BODY MASS INDEX: 34.47 KG/M2 | DIASTOLIC BLOOD PRESSURE: 82 MMHG

## 2024-09-18 DIAGNOSIS — Z90.11 HISTORY OF RIGHT TOTAL MASTECTOMY: ICD-10-CM

## 2024-09-18 DIAGNOSIS — C50.911 MALIGNANT NEOPLASM OF RIGHT FEMALE BREAST, UNSPECIFIED ESTROGEN RECEPTOR STATUS, UNSPECIFIED SITE OF BREAST: ICD-10-CM

## 2024-09-18 DIAGNOSIS — Z85.3 HISTORY OF RIGHT BREAST CANCER: Primary | ICD-10-CM

## 2024-09-18 DIAGNOSIS — Z92.21 HISTORY OF AROMATASE INHIBITOR THERAPY: ICD-10-CM

## 2024-09-18 PROCEDURE — 1126F AMNT PAIN NOTED NONE PRSNT: CPT | Performed by: NURSE PRACTITIONER

## 2024-09-18 PROCEDURE — 99203 OFFICE O/P NEW LOW 30 MIN: CPT | Performed by: NURSE PRACTITIONER

## 2024-09-18 PROCEDURE — 1160F RVW MEDS BY RX/DR IN RCRD: CPT | Performed by: NURSE PRACTITIONER

## 2024-09-18 PROCEDURE — 1159F MED LIST DOCD IN RCRD: CPT | Performed by: NURSE PRACTITIONER

## 2024-09-18 PROCEDURE — 99213 OFFICE O/P EST LOW 20 MIN: CPT | Performed by: NURSE PRACTITIONER

## 2024-09-18 ASSESSMENT — PAIN SCALES - GENERAL: PAINLEVEL: 0-NO PAIN

## 2024-09-18 NOTE — PROGRESS NOTES
Oncology Follow-Up    Harriet HOLT [facility]  [id number]        Cancer Staging   No matching staging information was found for the patient.    Treatment Synopsis:    1. Status post right mastectomy and SLN dissection for a 0.125mm IDC, grade 1-2, ER > 95%, CO negative and HER2 IHC 2+ but insufficient tissue to do CORRINE diagnosed in October 2017. 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 Six ALNs negative for metastases.  2. Started on anastrozole December 2017. Plan to discontinue December 2022.       Subjective    Harriet presents for her Routine follow up visit. She is adjusting to jail. She is planning a vacation with her family to [place]. Harriet rates her energy level as 7/10 and reports no distress. She is taking a water aerobics class and walks occasionally for exercise. Harriet is working on a more healthier diet by lowering her simple carbohydrates. She is doing monthly Breast self exams. Harriet sees dermatology yearly for a skin check. She is up to date on her vaccines, colonoscopy, and vaccines. She denies any unusual headaches, balance issues, depression, cough, shortness of breath, problems swallowing, changes in chest/breast area, abdominal pain, bone or muscle pain, vaginal bleeding, rectal bleeding, blood in the urine, vaginal dryness, swelling arms or legs, new or unusual skin moles or lesions.     Objective      Vitals:    09/18/24 0922   BP: 131/82   Pulse: 78        Constitutional: Well developed, alert/oriented x3, no distress, cooperative   Eyes: clear sclera   ENMT: mucous membranes moist, no apparent lesions   Head/Neck: Neck supple, no bruits   Respiratory/Thorax: Patent airways, normal breath sounds with good chest expansion   Cardiovascular: Regular rate and rhythm, no murmurs, 2+ equal pulses of the extremities,   Gastrointestinal: Nondistended, soft, non-tender, no masses palpable, no organomegaly   Musculoskeletal: ROM intact,  no joint swelling, normal strength   Extremities: normal extremities, no edema, cyanosis, contusions or wounds   Neurological: alert and oriented x3,  normal strength   Breast:  Right mastectomy with well healed incision and well healed right axillary incision; no masses, nodules, skin changes, discharge. Left breast without masses, nodules, skin changes, discharge.    Lymphatic: No significant lymphadenopathy   Psychological: Appropriate mood and behavior   Skin: Warm and dry, no lesions, no rashes      Physical Exam     Lab Results   Component Value Date    WBC 4.9 05/10/2024    HGB 14.3 05/10/2024    HCT 44.0 05/10/2024    MCV 92 05/10/2024     05/10/2024       Chemistry    Lab Results   Component Value Date/Time     05/10/2024 0942    K 4.3 05/10/2024 0942     05/10/2024 0942    CO2 30 05/10/2024 0942    BUN 8 05/10/2024 0942    CREATININE 0.61 05/10/2024 0942    Lab Results   Component Value Date/Time    CALCIUM 8.9 05/10/2024 0942    ALKPHOS 85 05/10/2024 0942    AST 22 05/10/2024 0942    ALT 26 05/10/2024 0942    BILITOT 0.4 05/10/2024 0942         Imaging:    Status Exam Begun Exam Ended   Final 4/15/2024 13:00 4/15/2024 13:49     Study Result    Narrative & Impression   Interpreted By:  Gigi Jenkins,   STUDY:  BI MAMMO LEFT DIAGNOSTIC TOMOSYNTHESIS;  4/15/2024 1:49 pm      ACCESSION NUMBER(S):  [id number]      ORDERING CLINICIAN:  EDUARDO NAIK      INDICATION:  Annual left screening mammogram. History of right mastectomy.      COMPARISON:  04/03/2023, 03/29/2022, 03/24/2021      FINDINGS:  2D and tomosynthesis images were reviewed at 1 mm slice thickness.      Density:  The breast tissue is heterogeneously dense, which may  obscure small masses.      Stable benign asymmetry in the central left breast at middle depth on  CC view. No suspicious masses or calcifications are identified in the  left breast.      IMPRESSION:  No mammographic evidence of malignancy in the left breast.       BI-RADS CATEGORY:      BI-RADS Category:  1 Negative.  Recommendation:  Annual Screening.  Recommended Date:  1 Year.  Laterality:  Left.         Assessment/Plan    Harriet is a 69 yo woman with a hx of right IDC (0.125cm) G1-2 IDC diagnosed October 2017. She is s/p right mastectomy, and completed a 5 year course of anastrozole in February 2022. There is no evidence of recurrent disease on today's exam.     Plan:  Exam is negative.  Encouraged monthly breast self exams, plant based diet, keep alcohol <3 drinks/week, exercise at least 2.5 hours/week. Consider chair, wall, table exercises to do at home.  We reviewed signs/symptoms of recurrence including new masses, new pigmented lesion, tugging or pulling of the skin, nipple discharge, rash in or around the chest area, or any new finding that doesn't resolve within a 2-3 weeks.  All of Harriet's questions/concerns were addressed.  Over 25 minutes of time was spent with this patient with >50% of the time with education, counseling, and coordination of care.   I will see her back in one year. At that time, if her exam is negative, we can consider transitioning her care back to Dr. Barrera.         Diagnoses and all orders for this visit:  History of right breast cancer  Malignant neoplasm of right female breast, unspecified estrogen receptor status, unspecified site of breast (Multi)  -     Clinic Appointment Request Follow Up; PATRICIA HOLLY; Future  History of right total mastectomy  History of aromatase inhibitor therapy      Patricia Holly, PAULIE-CNP

## 2024-09-18 NOTE — PATIENT INSTRUCTIONS
Please call us at [phone] option 5 then option 2 with any questions or concerns     See you in 1 yr    1. Exercise 2.5 hours per week; bone strengthening, cardio-vascular, resistance training. Consider table, chair, wall exercises that can be found on the internet.  2. Please check your left breast and right mastectomy site monthly.  3. Keep alcohol under 3 drinks per week.  4. Sun safety - limit sun exposure from 11a-2p when its at its hottest, apply 15-30 sun block and re-apply every 1-2 hours if perspiring or swimming. See dermatology yearly.  5. Eat a plant based diet, add in oily fishes such as mackerel, tuna, and salmon.  6. Get in at least 1,000 mg of calcium per day through diet or supplement for bone strength. Examples of foods higher in calcium are milk, yogurt, fruited yogurt, oranges, fortified orange juice, almonds, almond milk, broccoli, spinach, bok nils, mustard greens, puddings, custards, ice cream, fortified cereals, bars, and crackers.   7. Your exam is negative today.  8. Please call the office if any new mass or rash in or around breast, or any uncontrolled symptoms that last over 2-3 weeks at [phone].  9. It was nice seeing you today, Harriet! I will see you back in one year. If your exam is negative at that time, and you feel comfortable doing so, we can transition your oncology follow up to Dr. Barrera.  Thank you for choosing [facility] for your care.

## 2024-10-16 DIAGNOSIS — E03.9 HYPOTHYROIDISM, UNSPECIFIED TYPE: ICD-10-CM

## 2024-10-16 RX ORDER — LEVOTHYROXINE SODIUM 100 UG/1
100 TABLET ORAL DAILY
Qty: 90 TABLET | Refills: 1 | Status: SHIPPED | OUTPATIENT
Start: 2024-10-16

## 2024-10-23 ENCOUNTER — APPOINTMENT (OUTPATIENT)
Dept: PRIMARY CARE | Facility: CLINIC | Age: 69
End: 2024-10-23
Payer: COMMERCIAL

## 2024-10-23 VITALS
OXYGEN SATURATION: 96 % | HEIGHT: 64 IN | DIASTOLIC BLOOD PRESSURE: 80 MMHG | TEMPERATURE: 97 F | SYSTOLIC BLOOD PRESSURE: 140 MMHG | WEIGHT: 201 LBS | HEART RATE: 89 BPM | BODY MASS INDEX: 34.31 KG/M2 | RESPIRATION RATE: 18 BRPM

## 2024-10-23 DIAGNOSIS — Z78.0 MENOPAUSE: ICD-10-CM

## 2024-10-23 DIAGNOSIS — Z85.3 HISTORY OF RIGHT BREAST CANCER: ICD-10-CM

## 2024-10-23 DIAGNOSIS — E78.2 MIXED HYPERLIPIDEMIA: ICD-10-CM

## 2024-10-23 DIAGNOSIS — E66.09 CLASS 1 OBESITY DUE TO EXCESS CALORIES WITHOUT SERIOUS COMORBIDITY WITH BODY MASS INDEX (BMI) OF 32.0 TO 32.9 IN ADULT: ICD-10-CM

## 2024-10-23 DIAGNOSIS — I89.0 LYMPHEDEMA: ICD-10-CM

## 2024-10-23 DIAGNOSIS — Z01.419 WELL WOMAN EXAM: ICD-10-CM

## 2024-10-23 DIAGNOSIS — E66.811 CLASS 1 OBESITY DUE TO EXCESS CALORIES WITHOUT SERIOUS COMORBIDITY WITH BODY MASS INDEX (BMI) OF 32.0 TO 32.9 IN ADULT: ICD-10-CM

## 2024-10-23 DIAGNOSIS — R79.9 ABNORMAL FINDING OF BLOOD CHEMISTRY, UNSPECIFIED: ICD-10-CM

## 2024-10-23 DIAGNOSIS — Z00.00 WELLNESS EXAMINATION: ICD-10-CM

## 2024-10-23 DIAGNOSIS — E55.9 VITAMIN D DEFICIENCY: ICD-10-CM

## 2024-10-23 DIAGNOSIS — R82.998 OTHER ABNORMAL FINDINGS IN URINE: ICD-10-CM

## 2024-10-23 DIAGNOSIS — N84.1 CERVICAL POLYP: ICD-10-CM

## 2024-10-23 DIAGNOSIS — E03.9 HYPOTHYROIDISM, UNSPECIFIED TYPE: ICD-10-CM

## 2024-10-23 DIAGNOSIS — E03.9 ACQUIRED HYPOTHYROIDISM: ICD-10-CM

## 2024-10-23 DIAGNOSIS — Z90.11 HISTORY OF RIGHT TOTAL MASTECTOMY: ICD-10-CM

## 2024-10-23 DIAGNOSIS — Z00.00 INITIAL MEDICARE ANNUAL WELLNESS VISIT: Primary | ICD-10-CM

## 2024-10-23 DIAGNOSIS — Z92.21 HISTORY OF AROMATASE INHIBITOR THERAPY: ICD-10-CM

## 2024-10-23 PROCEDURE — 3008F BODY MASS INDEX DOCD: CPT | Performed by: INTERNAL MEDICINE

## 2024-10-23 PROCEDURE — 1170F FXNL STATUS ASSESSED: CPT | Performed by: INTERNAL MEDICINE

## 2024-10-23 PROCEDURE — G0438 PPPS, INITIAL VISIT: HCPCS | Performed by: INTERNAL MEDICINE

## 2024-10-23 PROCEDURE — 1159F MED LIST DOCD IN RCRD: CPT | Performed by: INTERNAL MEDICINE

## 2024-10-23 ASSESSMENT — ENCOUNTER SYMPTOMS
GASTROINTESTINAL NEGATIVE: 1
NUMBNESS: 0
FATIGUE: 0
PALPITATIONS: 0
APPETITE CHANGE: 0
DIZZINESS: 0
HEADACHES: 0
EYE DISCHARGE: 0
CHILLS: 0
UNEXPECTED WEIGHT CHANGE: 0
LIGHT-HEADEDNESS: 0
COUGH: 0
HEMATOLOGIC/LYMPHATIC NEGATIVE: 1
ENDOCRINE NEGATIVE: 1
EYE REDNESS: 0
FACIAL ASYMMETRY: 0
ALLERGIC/IMMUNOLOGIC NEGATIVE: 1
PHOTOPHOBIA: 0
ARTHRALGIAS: 0
ACTIVITY CHANGE: 0
DIAPHORESIS: 0
CHEST TIGHTNESS: 0
SHORTNESS OF BREATH: 0
WHEEZING: 0
PSYCHIATRIC NEGATIVE: 1
FEVER: 0
APNEA: 0
EYE ITCHING: 0
EYE PAIN: 0
CHOKING: 0
STRIDOR: 0

## 2024-10-23 ASSESSMENT — ACTIVITIES OF DAILY LIVING (ADL)
GROCERY_SHOPPING: INDEPENDENT
DRESSING: INDEPENDENT
TAKING_MEDICATION: INDEPENDENT
BATHING: INDEPENDENT
MANAGING_FINANCES: INDEPENDENT
DOING_HOUSEWORK: INDEPENDENT

## 2024-10-23 ASSESSMENT — PATIENT HEALTH QUESTIONNAIRE - PHQ9
2. FEELING DOWN, DEPRESSED OR HOPELESS: NOT AT ALL
1. LITTLE INTEREST OR PLEASURE IN DOING THINGS: NOT AT ALL
SUM OF ALL RESPONSES TO PHQ9 QUESTIONS 1 AND 2: 0

## 2024-10-23 NOTE — ASSESSMENT & PLAN NOTE
WE DID ADDRESS  Orders:    TSH with reflex to Free T4 if abnormal; Future    Urinalysis with Reflex Culture and Microscopic; Future

## 2024-10-23 NOTE — ASSESSMENT & PLAN NOTE
LABS NEEDED  Orders:    TSH with reflex to Free T4 if abnormal; Future    Urinalysis with Reflex Culture and Microscopic; Future

## 2024-10-23 NOTE — ASSESSMENT & PLAN NOTE
STABLE  Orders:    TSH with reflex to Free T4 if abnormal; Future    Urinalysis with Reflex Culture and Microscopic; Future

## 2024-10-23 NOTE — ASSESSMENT & PLAN NOTE
See franz NEGATIVE  Orders:    TSH with reflex to Free T4 if abnormal; Future    Urinalysis with Reflex Culture and Microscopic; Future

## 2024-10-23 NOTE — ASSESSMENT & PLAN NOTE
Orders:    TSH with reflex to Free T4 if abnormal; Future    Urinalysis with Reflex Culture and Microscopic; Future

## 2024-10-23 NOTE — PROGRESS NOTES
"Subjective   Reason for Visit: Harriet Roth is an 68 y.o. female here for a Medicare Wellness visit.     Past Medical, Surgical, and Family History reviewed and updated in chart.    Reviewed all medications by prescribing practitioner or clinical pharmacist (such as prescriptions, OTCs, herbal therapies and supplements) and documented in the medical record.    HPI    The patient is doing well , feels well, no specific complaints.   Tolerating and taking med's with no significant side effects.   No other issues noted on questions.     PMHX, PSHX, ALL, SOCHX, PRE MED ALL REVIEWED     MMSE 30 30     PHQ 2 NL     NO FALLS NOTED     PREVENTIVE MEDICINE:  Mammogram April 2024  Dexa April 2024  Psa  Colonoscopy   VACCINES REVIEWED  utd    Patient Care Team:  Amandeep Barrera MD as PCP - General     Review of Systems   Constitutional:  Negative for activity change, appetite change, chills, diaphoresis, fatigue, fever and unexpected weight change.   HENT: Negative.     Eyes:  Negative for photophobia, pain, discharge, redness, itching and visual disturbance.   Respiratory:  Negative for apnea, cough, choking, chest tightness, shortness of breath, wheezing and stridor.    Cardiovascular:  Negative for chest pain, palpitations and leg swelling.   Gastrointestinal: Negative.    Endocrine: Negative.    Genitourinary: Negative.    Musculoskeletal:  Negative for arthralgias.   Skin: Negative.    Allergic/Immunologic: Negative.    Neurological:  Negative for dizziness, facial asymmetry, light-headedness, numbness and headaches.   Hematological: Negative.    Psychiatric/Behavioral: Negative.         Objective   Vitals:  /80 (BP Location: Left arm, Patient Position: Sitting, BP Cuff Size: Adult)   Pulse 89   Temp 36.1 °C (97 °F) (Temporal)   Resp 18   Ht 1.626 m (5' 4\")   Wt 91.2 kg (201 lb)   SpO2 96%   BMI 34.50 kg/m²       Physical Exam  HENT:      Head: Normocephalic.      Right Ear: Tympanic membrane normal.      " Nose: Nose normal.      Mouth/Throat:      Mouth: Mucous membranes are moist.   Eyes:      Pupils: Pupils are equal, round, and reactive to light.   Cardiovascular:      Rate and Rhythm: Normal rate and regular rhythm.   Pulmonary:      Effort: Pulmonary effort is normal.   Abdominal:      General: Abdomen is flat. Bowel sounds are normal.   Musculoskeletal:         General: Normal range of motion.   Skin:     General: Skin is warm.      Capillary Refill: Capillary refill takes less than 2 seconds.   Neurological:      Mental Status: She is alert and oriented to person, place, and time.       Assessment & Plan  History of aromatase inhibitor therapy    Orders:  •  TSH with reflex to Free T4 if abnormal; Future  •  Urinalysis with Reflex Culture and Microscopic; Future    Cervical polyp  See franz NEGATIVE  Orders:  •  TSH with reflex to Free T4 if abnormal; Future  •  Urinalysis with Reflex Culture and Microscopic; Future    Menopause  STABLE  Orders:  •  TSH with reflex to Free T4 if abnormal; Future  •  Urinalysis with Reflex Culture and Microscopic; Future    Wellness examination    Orders:  •  CBC; Future  •  Lipid Panel; Future  •  Comprehensive Metabolic Panel; Future  •  TSH with reflex to Free T4 if abnormal; Future  •  Urinalysis with Reflex Culture and Microscopic; Future    Mixed hyperlipidemia  LABS NEEDED  Orders:  •  TSH with reflex to Free T4 if abnormal; Future  •  Urinalysis with Reflex Culture and Microscopic; Future    Acquired hypothyroidism  ON MEDS  Orders:  •  TSH with reflex to Free T4 if abnormal; Future  •  Urinalysis with Reflex Culture and Microscopic; Future    Class 1 obesity due to excess calories without serious comorbidity with body mass index (BMI) of 32.0 to 32.9 in adult  DIET CARDIO   Orders:  •  TSH with reflex to Free T4 if abnormal; Future  •  Urinalysis with Reflex Culture and Microscopic; Future    Well woman exam    Orders:  •  TSH with reflex to Free T4 if abnormal; Future  •   Urinalysis with Reflex Culture and Microscopic; Future    Hypothyroidism, unspecified type    Orders:  •  TSH with reflex to Free T4 if abnormal; Future  •  Urinalysis with Reflex Culture and Microscopic; Future    Initial Medicare annual wellness visit    Orders:  •  TSH with reflex to Free T4 if abnormal; Future  •  Urinalysis with Reflex Culture and Microscopic; Future    BMI 34.0-34.9,adult  WE DID ADDRESS  Orders:  •  TSH with reflex to Free T4 if abnormal; Future  •  Urinalysis with Reflex Culture and Microscopic; Future    Lymphedema  OK  Orders:  •  TSH with reflex to Free T4 if abnormal; Future  •  Urinalysis with Reflex Culture and Microscopic; Future    History of right total mastectomy    Orders:  •  TSH with reflex to Free T4 if abnormal; Future  •  Urinalysis with Reflex Culture and Microscopic; Future    History of right breast cancer  SEES ONC   Orders:  •  TSH with reflex to Free T4 if abnormal; Future  •  Urinalysis with Reflex Culture and Microscopic; Future    Vitamin D deficiency    Orders:  •  Vitamin D 25-Hydroxy,Total (for eval of Vitamin D levels); Future  •  TSH with reflex to Free T4 if abnormal; Future  •  Urinalysis with Reflex Culture and Microscopic; Future    Abnormal finding of blood chemistry, unspecified    Orders:  •  CBC; Future    Other abnormal findings in urine    Orders:  •  Urinalysis with Reflex Culture and Microscopic; Future

## 2024-10-23 NOTE — ASSESSMENT & PLAN NOTE
OK  Orders:    TSH with reflex to Free T4 if abnormal; Future    Urinalysis with Reflex Culture and Microscopic; Future

## 2024-10-23 NOTE — ASSESSMENT & PLAN NOTE
DIET CARDIO   Orders:    TSH with reflex to Free T4 if abnormal; Future    Urinalysis with Reflex Culture and Microscopic; Future

## 2024-10-23 NOTE — ASSESSMENT & PLAN NOTE
Orders:    CBC; Future    Lipid Panel; Future    Comprehensive Metabolic Panel; Future    TSH with reflex to Free T4 if abnormal; Future    Urinalysis with Reflex Culture and Microscopic; Future

## 2024-10-23 NOTE — ASSESSMENT & PLAN NOTE
SEES ONC   Orders:    TSH with reflex to Free T4 if abnormal; Future    Urinalysis with Reflex Culture and Microscopic; Future

## 2024-10-23 NOTE — ASSESSMENT & PLAN NOTE
ON MEDS  Orders:    TSH with reflex to Free T4 if abnormal; Future    Urinalysis with Reflex Culture and Microscopic; Future

## 2024-10-25 ENCOUNTER — APPOINTMENT (OUTPATIENT)
Dept: PRIMARY CARE | Facility: CLINIC | Age: 69
End: 2024-10-25
Payer: COMMERCIAL

## 2024-11-09 DIAGNOSIS — E78.2 MIXED HYPERLIPIDEMIA: ICD-10-CM

## 2024-11-09 DIAGNOSIS — E03.9 HYPOTHYROIDISM, UNSPECIFIED TYPE: ICD-10-CM

## 2024-11-11 RX ORDER — ATORVASTATIN CALCIUM 10 MG/1
10 TABLET, FILM COATED ORAL DAILY
Qty: 90 TABLET | Refills: 1 | Status: SHIPPED | OUTPATIENT
Start: 2024-11-11

## 2024-11-13 ENCOUNTER — LAB (OUTPATIENT)
Dept: LAB | Facility: LAB | Age: 69
End: 2024-11-13
Payer: MEDICARE

## 2024-11-13 DIAGNOSIS — I89.0 LYMPHEDEMA: ICD-10-CM

## 2024-11-13 DIAGNOSIS — E55.9 VITAMIN D DEFICIENCY: ICD-10-CM

## 2024-11-13 DIAGNOSIS — E66.09 CLASS 1 OBESITY DUE TO EXCESS CALORIES WITHOUT SERIOUS COMORBIDITY WITH BODY MASS INDEX (BMI) OF 32.0 TO 32.9 IN ADULT: ICD-10-CM

## 2024-11-13 DIAGNOSIS — Z85.3 HISTORY OF RIGHT BREAST CANCER: ICD-10-CM

## 2024-11-13 DIAGNOSIS — N84.1 CERVICAL POLYP: ICD-10-CM

## 2024-11-13 DIAGNOSIS — Z90.11 HISTORY OF RIGHT TOTAL MASTECTOMY: ICD-10-CM

## 2024-11-13 DIAGNOSIS — Z00.00 WELLNESS EXAMINATION: ICD-10-CM

## 2024-11-13 DIAGNOSIS — E78.2 MIXED HYPERLIPIDEMIA: ICD-10-CM

## 2024-11-13 DIAGNOSIS — E03.9 HYPOTHYROIDISM, UNSPECIFIED TYPE: ICD-10-CM

## 2024-11-13 DIAGNOSIS — E66.811 CLASS 1 OBESITY DUE TO EXCESS CALORIES WITHOUT SERIOUS COMORBIDITY WITH BODY MASS INDEX (BMI) OF 32.0 TO 32.9 IN ADULT: ICD-10-CM

## 2024-11-13 DIAGNOSIS — R82.998 OTHER ABNORMAL FINDINGS IN URINE: ICD-10-CM

## 2024-11-13 DIAGNOSIS — E03.9 ACQUIRED HYPOTHYROIDISM: ICD-10-CM

## 2024-11-13 DIAGNOSIS — Z78.0 MENOPAUSE: ICD-10-CM

## 2024-11-13 DIAGNOSIS — Z00.00 INITIAL MEDICARE ANNUAL WELLNESS VISIT: ICD-10-CM

## 2024-11-13 DIAGNOSIS — Z92.21 HISTORY OF AROMATASE INHIBITOR THERAPY: ICD-10-CM

## 2024-11-13 DIAGNOSIS — Z01.419 WELL WOMAN EXAM: ICD-10-CM

## 2024-11-13 DIAGNOSIS — R79.9 ABNORMAL FINDING OF BLOOD CHEMISTRY, UNSPECIFIED: ICD-10-CM

## 2024-11-13 LAB
25(OH)D3 SERPL-MCNC: 46 NG/ML (ref 30–100)
ALBUMIN SERPL BCP-MCNC: 4.2 G/DL (ref 3.4–5)
ALP SERPL-CCNC: 82 U/L (ref 33–136)
ALT SERPL W P-5'-P-CCNC: 27 U/L (ref 7–45)
ANION GAP SERPL CALC-SCNC: 11 MMOL/L (ref 10–20)
APPEARANCE UR: CLEAR
AST SERPL W P-5'-P-CCNC: 22 U/L (ref 9–39)
BILIRUB SERPL-MCNC: 0.5 MG/DL (ref 0–1.2)
BILIRUB UR STRIP.AUTO-MCNC: NEGATIVE MG/DL
BUN SERPL-MCNC: 10 MG/DL (ref 6–23)
CALCIUM SERPL-MCNC: 8.9 MG/DL (ref 8.6–10.3)
CHLORIDE SERPL-SCNC: 102 MMOL/L (ref 98–107)
CHOLEST SERPL-MCNC: 179 MG/DL (ref 0–199)
CHOLESTEROL/HDL RATIO: 3.3
CO2 SERPL-SCNC: 31 MMOL/L (ref 21–32)
COLOR UR: NORMAL
CREAT SERPL-MCNC: 0.68 MG/DL (ref 0.5–1.05)
EGFRCR SERPLBLD CKD-EPI 2021: >90 ML/MIN/1.73M*2
ERYTHROCYTE [DISTWIDTH] IN BLOOD BY AUTOMATED COUNT: 11.9 % (ref 11.5–14.5)
GLUCOSE SERPL-MCNC: 107 MG/DL (ref 74–99)
GLUCOSE UR STRIP.AUTO-MCNC: NORMAL MG/DL
HCT VFR BLD AUTO: 43.8 % (ref 36–46)
HDLC SERPL-MCNC: 54.5 MG/DL
HGB BLD-MCNC: 14.1 G/DL (ref 12–16)
HOLD SPECIMEN: NORMAL
KETONES UR STRIP.AUTO-MCNC: NEGATIVE MG/DL
LDLC SERPL CALC-MCNC: 81 MG/DL
LEUKOCYTE ESTERASE UR QL STRIP.AUTO: NEGATIVE
MCH RBC QN AUTO: 29.7 PG (ref 26–34)
MCHC RBC AUTO-ENTMCNC: 32.2 G/DL (ref 32–36)
MCV RBC AUTO: 92 FL (ref 80–100)
NITRITE UR QL STRIP.AUTO: NEGATIVE
NON HDL CHOLESTEROL: 125 MG/DL (ref 0–149)
NRBC BLD-RTO: 0 /100 WBCS (ref 0–0)
PH UR STRIP.AUTO: 7.5 [PH]
PLATELET # BLD AUTO: 286 X10*3/UL (ref 150–450)
POTASSIUM SERPL-SCNC: 4.5 MMOL/L (ref 3.5–5.3)
PROT SERPL-MCNC: 6.7 G/DL (ref 6.4–8.2)
PROT UR STRIP.AUTO-MCNC: NEGATIVE MG/DL
RBC # BLD AUTO: 4.74 X10*6/UL (ref 4–5.2)
RBC # UR STRIP.AUTO: NEGATIVE /UL
SODIUM SERPL-SCNC: 139 MMOL/L (ref 136–145)
SP GR UR STRIP.AUTO: 1.02
TRIGL SERPL-MCNC: 218 MG/DL (ref 0–149)
TSH SERPL-ACNC: 2.31 MIU/L (ref 0.44–3.98)
UROBILINOGEN UR STRIP.AUTO-MCNC: NORMAL MG/DL
VLDL: 44 MG/DL (ref 0–40)
WBC # BLD AUTO: 5.7 X10*3/UL (ref 4.4–11.3)

## 2024-11-13 PROCEDURE — 36415 COLL VENOUS BLD VENIPUNCTURE: CPT

## 2024-11-13 PROCEDURE — 82306 VITAMIN D 25 HYDROXY: CPT

## 2024-11-13 PROCEDURE — 80053 COMPREHEN METABOLIC PANEL: CPT

## 2024-11-13 PROCEDURE — 84443 ASSAY THYROID STIM HORMONE: CPT

## 2024-11-13 PROCEDURE — 81003 URINALYSIS AUTO W/O SCOPE: CPT

## 2024-11-13 PROCEDURE — 85027 COMPLETE CBC AUTOMATED: CPT

## 2024-11-13 PROCEDURE — 80061 LIPID PANEL: CPT

## 2025-04-02 NOTE — PROGRESS NOTES
Harriet Roth female   1955 69 y.o.   [id number]      Chief Complaint  Annual mammogram and exam, history of right breast cancer    History Of Present Illness  Harriet Roth is a very pleasant 69 year old  woman diagnosed 10/5/2017 with right breast multicentric invasive ductal carcinoma and papillary carcinoma, ER >95%, NM negative, HER 2 positive. 2017 Betina Elizondo performed right simple mastectomy and sentinel lymph node biopsy. Final pathology revealed papillary carcinoma and ductal carcinoma in situ (DCIS), multiple foci of invasive carcinoma were arising in association with papillary carcinoma, total extent measuring 4cm, margins negative, sentinel lymph nodes, negative (0/6). Postoperative radiation was not recommended. Anti-HER2 therapy was not recommended based on HER2 3+ only present in the papillary component. 2017 Anastrazole was initiated under the direction of Amandeep Moreno with a five year plan. She completed in 2022 with five years of therapy. She retired on 2024 and is enjoying her time. She presents today for annual mammogram and exam. She denies any new masses or lumps.   Stage [id number]      BREAST IMAGIN/15/2024 Left screening mammogram, indicates BI-RADS Category 1.      FEMALE HISTORY: menarche age 14, , first birth age 35,  x1 month, OCP's x 4 years, menopause age 56, no HRT, heterogeneously dense tissue     FAMILY CANCER HISTORY:   Father: Prostate cancer, 65      Surgical History  She has a past surgical history that includes Tonsillectomy (2019); Other surgical history (2019); Lymph node biopsy; Mastectomy; and Breast biopsy.     Social History  She reports that she has never smoked. She has never used smokeless tobacco. She reports that she does not currently use alcohol. She reports that she does not use drugs.    Family History  Family History   Problem Relation Name Age of Onset    Other (HYSTERECTOMY PARTIAL) Mother  Karina Bishop     Hypertension Mother Karina Bishop     Arthritis Mother Karina Bishop     Prostate cancer Father Adrian Bishop     Cancer Father Adrian Bishop     Hypertension Brother Adrian Bishop     Hypertension Brother Adrian Bishop     Hypertension Brother Adrian Bishop         Allergies  Adhesive tape-silicones    Medications  Current Outpatient Medications   Medication Instructions    atorvastatin (LIPITOR) 10 mg, oral, Daily    calcium carbonate-vitamin D3 600 mg-5 mcg (200 unit) tablet     cholecalciferol (VITAMIN D-3) 125 mcg, Daily    cyanocobalamin, vitamin B-12, (VITAMIN B-12 ORAL) Take by mouth.    fluticasone (Flonase) 50 mcg/actuation nasal spray 1 spray, Every morning    levothyroxine (SYNTHROID, LEVOXYL) 100 mcg, oral, Daily    multivit-min-vit C-herb no.124 (Airborne, ascorbic acid,) 250-8.875 mg tablet,chewable Airborne Oral Tablet Chewable   Refills: 0        Start : 24-Mar-2021   Active    multivitamin with minerals (Adults Multivitamin) tablet Multivitamin Adults Oral Tablet   Refills: 0        Start : 13-Jun-2018   Active    mv-min/vit C/glut/lysine/hb124 (IMMUNE SUPPORT ORAL) Take by mouth.         REVIEW OF SYSTEMS    Constitutional:  Negative for appetite change, fatigue, fever and unexpected weight change.   HENT:  Negative for ear pain, hearing loss, nosebleeds, sore throat and trouble swallowing.    Eyes:  Negative for discharge, itching and visual disturbance.   Respiratory:  Negative for cough, chest tightness and shortness of breath.    Cardiovascular:  Negative for chest pain, palpitations and leg swelling.   Breast: as indicated in HPI  Gastrointestinal:  Negative for abdominal pain, constipation, diarrhea and nausea.   Endocrine: Negative for cold intolerance and heat intolerance.   Genitourinary:  Negative for dysuria, frequency, hematuria, pelvic pain and vaginal bleeding.   Musculoskeletal:  Negative for arthralgias, back pain, gait problem, joint swelling and myalgias.   Skin:  Negative for  color change and rash.   Allergic/Immunologic: Negative for environmental allergies and food allergies.   Neurological:  Negative for dizziness, tremors, speech difficulty, weakness, numbness and headaches.   Hematological:  Does not bruise/bleed easily.   Psychiatric/Behavioral:  Negative for agitation, dysphoric mood and sleep disturbance. The patient is not nervous/anxious.         Past Medical History  She has a past medical history of Abnormal Pap smear of cervix (11/2013 + late 1980s), ADHD (attention deficit hyperactivity disorder), Anemia ([place]), Arthritis, Breast cancer, Colon polyp, Disease of thyroid gland (? 2008), Elevated blood-pressure reading, without diagnosis of hypertension (12/04/2019), Headache, Inflammatory disease of cervix uteri (06/12/2019), Mild cervical dysplasia (12/04/2019), Personal history of other endocrine, nutritional and metabolic disease (12/04/2019), Varicella (Child), and Visual impairment.     Physical Exam  Patient is alert and oriented x3 and in a relaxed and appropriate mood. Her gait is steady and hand grasps are equal. Sclera is clear. The right breast and nipple have been removed with a well-healed transverse mastectomy incision. The right axilla has a well-healed biopsy incision. The overlying tissue is soft without palpable abnormalities, discrete nodules or masses. The left breast tissue is soft without palpable abnormalities, discrete nodules or masses. The skin and left nipple appear normal. There is no cervical, supraclavicular or axillary lymphadenopathy. Heart rate and rhythm normal, S1 and S2 appreciated. The lungs are clear to auscultation bilaterally.     Physical Exam  Chest:              Last Recorded Vitals  Vitals:    04/17/25 1243   BP: 151/84   Pulse: 91   Temp: 36.4 °C (97.6 °F)   SpO2: 95%         Relevant Results   Time was spent viewing digital images of the radiology testing with the patient, results pending    Assessment/Plan   Normal clinical  exam, screening mammogram, history of right invasive cancer, no evidence of recurrence, s/p right mastectomy without reconstruction, Anastrazole endocrine therapy completed, no family history of breast cancer, heterogeneously dense tissue     Plan: Return in one year for left screening mammogram and office visit. Offered prescription for breast prosthesis and bra, declines.     Patient Discussion/Summary  Your clinical examination is normal. Please return in one year for left screening mammogram and office visit or sooner if you have any problems or concerns.     You can see your health information, review clinical summaries from office visits & test results online when you follow your health with MY  Chart, a personal health record. To sign up go to [email or web address] If you need assistance with signing up or trouble getting into your account call [employer] Patient Line 24/7 at [phone].    My office phone number is [phone] if you need to get in touch with me or have additional questions or concerns. Thank you for choosing [facility] and trusting me as your healthcare provider. I look forward to seeing you again at your next office visit. I am honored to be a provider on your health care team and I remain dedicated to helping you achieve your health goals.      Amara Worley, APRN-CNP

## 2025-04-10 DIAGNOSIS — E03.9 HYPOTHYROIDISM, UNSPECIFIED TYPE: ICD-10-CM

## 2025-04-10 RX ORDER — LEVOTHYROXINE SODIUM 100 UG/1
100 TABLET ORAL DAILY
Qty: 90 TABLET | Refills: 1 | Status: SHIPPED | OUTPATIENT
Start: 2025-04-10

## 2025-04-17 ENCOUNTER — HOSPITAL ENCOUNTER (OUTPATIENT)
Dept: RADIOLOGY | Facility: CLINIC | Age: 70
Discharge: HOME | End: 2025-04-17
Payer: MEDICARE

## 2025-04-17 ENCOUNTER — OFFICE VISIT (OUTPATIENT)
Dept: SURGICAL ONCOLOGY | Facility: CLINIC | Age: 70
End: 2025-04-17
Payer: MEDICARE

## 2025-04-17 VITALS
HEART RATE: 91 BPM | OXYGEN SATURATION: 95 % | TEMPERATURE: 97.6 F | WEIGHT: 206.4 LBS | SYSTOLIC BLOOD PRESSURE: 151 MMHG | DIASTOLIC BLOOD PRESSURE: 84 MMHG | BODY MASS INDEX: 35.41 KG/M2

## 2025-04-17 VITALS — HEIGHT: 64 IN | BODY MASS INDEX: 34.33 KG/M2 | WEIGHT: 201.06 LBS

## 2025-04-17 DIAGNOSIS — Z85.3 ENCOUNTER FOR FOLLOW-UP SURVEILLANCE OF BREAST CANCER: ICD-10-CM

## 2025-04-17 DIAGNOSIS — Z08 ENCOUNTER FOR FOLLOW-UP SURVEILLANCE OF BREAST CANCER: ICD-10-CM

## 2025-04-17 DIAGNOSIS — Z12.31 ENCOUNTER FOR SCREENING MAMMOGRAM FOR MALIGNANT NEOPLASM OF BREAST: ICD-10-CM

## 2025-04-17 PROCEDURE — 77067 SCR MAMMO BI INCL CAD: CPT | Mod: 52,LT

## 2025-04-17 PROCEDURE — 99213 OFFICE O/P EST LOW 20 MIN: CPT | Performed by: NURSE PRACTITIONER

## 2025-04-17 PROCEDURE — 1036F TOBACCO NON-USER: CPT | Performed by: NURSE PRACTITIONER

## 2025-04-17 PROCEDURE — 1126F AMNT PAIN NOTED NONE PRSNT: CPT | Performed by: NURSE PRACTITIONER

## 2025-04-17 PROCEDURE — 1159F MED LIST DOCD IN RCRD: CPT | Performed by: NURSE PRACTITIONER

## 2025-04-17 ASSESSMENT — PAIN SCALES - GENERAL: PAINLEVEL_OUTOF10: 0-NO PAIN

## 2025-04-17 ASSESSMENT — PATIENT HEALTH QUESTIONNAIRE - PHQ9
2. FEELING DOWN, DEPRESSED OR HOPELESS: NOT AT ALL
SUM OF ALL RESPONSES TO PHQ9 QUESTIONS 1 & 2: 0
1. LITTLE INTEREST OR PLEASURE IN DOING THINGS: NOT AT ALL

## 2025-04-17 NOTE — PATIENT INSTRUCTIONS
Your clinical examination is normal. Please return in one year for left screening mammogram and office visit or sooner if you have any problems or concerns.     You can see your health information, review clinical summaries from office visits & test results online when you follow your health with MY  Chart, a personal health record. To sign up go to [email or web address]. If you need assistance with signing up or trouble getting into your account call [employer] Patient Line 24/7 at [phone].    My office phone number is [phone] if you need to get in touch with me or have additional questions or concerns. Thank you for choosing [facility] and trusting me as your healthcare provider. I look forward to seeing you again at your next office visit. I am honored to be a provider on your health care team and I remain dedicated to helping you achieve your health goals.

## 2025-04-25 ENCOUNTER — APPOINTMENT (OUTPATIENT)
Dept: PRIMARY CARE | Facility: CLINIC | Age: 70
End: 2025-04-25
Payer: COMMERCIAL

## 2025-04-25 VITALS
HEIGHT: 64 IN | OXYGEN SATURATION: 96 % | DIASTOLIC BLOOD PRESSURE: 76 MMHG | WEIGHT: 209 LBS | BODY MASS INDEX: 35.68 KG/M2 | HEART RATE: 72 BPM | SYSTOLIC BLOOD PRESSURE: 128 MMHG

## 2025-04-25 DIAGNOSIS — E11.9 TYPE 2 DIABETES MELLITUS WITHOUT COMPLICATION, WITHOUT LONG-TERM CURRENT USE OF INSULIN: ICD-10-CM

## 2025-04-25 DIAGNOSIS — Z00.00 WELLNESS EXAMINATION: ICD-10-CM

## 2025-04-25 DIAGNOSIS — R73.02 IGT (IMPAIRED GLUCOSE TOLERANCE): ICD-10-CM

## 2025-04-25 DIAGNOSIS — E78.2 MIXED HYPERLIPIDEMIA: ICD-10-CM

## 2025-04-25 DIAGNOSIS — E03.9 ACQUIRED HYPOTHYROIDISM: ICD-10-CM

## 2025-04-25 DIAGNOSIS — J06.9 UPPER RESPIRATORY TRACT INFECTION, UNSPECIFIED TYPE: Primary | ICD-10-CM

## 2025-04-25 DIAGNOSIS — Z90.11 HISTORY OF RIGHT TOTAL MASTECTOMY: ICD-10-CM

## 2025-04-25 DIAGNOSIS — E03.9 HYPOTHYROIDISM, UNSPECIFIED TYPE: ICD-10-CM

## 2025-04-25 DIAGNOSIS — Z00.00 ROUTINE CHECK-UP: ICD-10-CM

## 2025-04-25 LAB
POC FINGERSTICK BLOOD GLUCOSE: 101 MG/DL (ref 70–100)
POC HEMOGLOBIN A1C: 6.2 % (ref 4.2–6.5)

## 2025-04-25 PROCEDURE — G2211 COMPLEX E/M VISIT ADD ON: HCPCS | Performed by: INTERNAL MEDICINE

## 2025-04-25 PROCEDURE — 99213 OFFICE O/P EST LOW 20 MIN: CPT | Performed by: INTERNAL MEDICINE

## 2025-04-25 PROCEDURE — 83036 HEMOGLOBIN GLYCOSYLATED A1C: CPT | Performed by: INTERNAL MEDICINE

## 2025-04-25 PROCEDURE — 3074F SYST BP LT 130 MM HG: CPT | Performed by: INTERNAL MEDICINE

## 2025-04-25 PROCEDURE — 3008F BODY MASS INDEX DOCD: CPT | Performed by: INTERNAL MEDICINE

## 2025-04-25 PROCEDURE — 82962 GLUCOSE BLOOD TEST: CPT | Performed by: INTERNAL MEDICINE

## 2025-04-25 PROCEDURE — 3044F HG A1C LEVEL LT 7.0%: CPT | Performed by: INTERNAL MEDICINE

## 2025-04-25 PROCEDURE — 1159F MED LIST DOCD IN RCRD: CPT | Performed by: INTERNAL MEDICINE

## 2025-04-25 PROCEDURE — 3078F DIAST BP <80 MM HG: CPT | Performed by: INTERNAL MEDICINE

## 2025-04-25 RX ORDER — TIRZEPATIDE 2.5 MG/.5ML
2.5 INJECTION, SOLUTION SUBCUTANEOUS
Qty: 2 ML | Refills: 2 | Status: SHIPPED | OUTPATIENT
Start: 2025-04-27

## 2025-04-25 RX ORDER — AZITHROMYCIN 250 MG/1
TABLET, FILM COATED ORAL
Qty: 6 TABLET | Refills: 0 | Status: SHIPPED | OUTPATIENT
Start: 2025-04-25 | End: 2025-04-30 | Stop reason: WASHOUT

## 2025-04-25 NOTE — PROGRESS NOTES
"MOUNJSubjective   Patient ID: Harriet Roth is a 69 y.o. female who presents for Follow-up.    HPI WEIGHT GAIN   SHE IS AWARE  HAS SOME URI SX   WE DID REVIEW ALL   EAR CONGESTION      Review of Systems    Objective   /76 (BP Location: Right arm, Patient Position: Sitting, BP Cuff Size: Adult)   Pulse 72   Ht 1.626 m (5' 4\")   Wt 94.8 kg (209 lb)   SpO2 96%   BMI 35.87 kg/m²     Physical Exam  NAD OBESE  CARD RRR  PULM CTA  ABD NEG   EXT  NL    Assessment/Plan   Diagnoses and all orders for this visit:  Upper respiratory tract infection, unspecified type  -     Comprehensive metabolic panel; Future  -     POCT fingerstick glucose manually resulted  -     azithromycin (Zithromax) 250 mg tablet; Take 2 tablets (500 mg) by mouth once daily for 1 day, THEN 1 tablet (250 mg) once daily for 4 days. Take 2 tabs (500 mg) by mouth today, than 1 daily for 4 days.  Mixed hyperlipidemia  Comments:  LABS  Orders:  -     Comprehensive metabolic panel; Future  -     POCT fingerstick glucose manually resulted  Acquired hypothyroidism  Comments:  LABS  Orders:  -     Comprehensive metabolic panel; Future  -     POCT fingerstick glucose manually resulted  History of right total mastectomy  Comments:  OK  Orders:  -     Comprehensive metabolic panel; Future  -     POCT fingerstick glucose manually resulted  IGT (impaired glucose tolerance)  -     Comprehensive metabolic panel; Future  -     POCT fingerstick glucose manually resulted  Wellness examination  -     Lipid Panel; Future  -     Comprehensive metabolic panel; Future  -     POCT fingerstick glucose manually resulted  Hypothyroidism, unspecified type  -     Comprehensive metabolic panel; Future  -     TSH with reflex to Free T4 if abnormal; Future  -     POCT fingerstick glucose manually resulted  Other orders  -     Follow Up In Primary Care - Established  -     Follow Up In Primary Care - Established; Future         "

## 2025-04-30 ENCOUNTER — APPOINTMENT (OUTPATIENT)
Dept: PRIMARY CARE | Facility: CLINIC | Age: 70
End: 2025-04-30
Payer: MEDICARE

## 2025-04-30 VITALS
HEIGHT: 64 IN | TEMPERATURE: 97.1 F | BODY MASS INDEX: 34.49 KG/M2 | SYSTOLIC BLOOD PRESSURE: 138 MMHG | HEART RATE: 80 BPM | DIASTOLIC BLOOD PRESSURE: 85 MMHG | WEIGHT: 202 LBS | OXYGEN SATURATION: 96 %

## 2025-04-30 DIAGNOSIS — R73.02 IGT (IMPAIRED GLUCOSE TOLERANCE): ICD-10-CM

## 2025-04-30 DIAGNOSIS — E03.9 ACQUIRED HYPOTHYROIDISM: ICD-10-CM

## 2025-04-30 DIAGNOSIS — E66.9 OBESITY (BMI 35.0-39.9 WITHOUT COMORBIDITY): ICD-10-CM

## 2025-04-30 DIAGNOSIS — E11.9 TYPE 2 DIABETES MELLITUS WITHOUT COMPLICATION, WITHOUT LONG-TERM CURRENT USE OF INSULIN: Primary | ICD-10-CM

## 2025-04-30 LAB
ALBUMIN SERPL-MCNC: 4.6 G/DL (ref 3.6–5.1)
ALP SERPL-CCNC: 85 U/L (ref 37–153)
ALT SERPL-CCNC: 33 U/L (ref 6–29)
ANION GAP SERPL CALCULATED.4IONS-SCNC: 8 MMOL/L (CALC) (ref 7–17)
AST SERPL-CCNC: 33 U/L (ref 10–35)
BILIRUB SERPL-MCNC: 0.6 MG/DL (ref 0.2–1.2)
BUN SERPL-MCNC: 12 MG/DL (ref 7–25)
CALCIUM SERPL-MCNC: 9.2 MG/DL (ref 8.6–10.4)
CHLORIDE SERPL-SCNC: 102 MMOL/L (ref 98–110)
CHOLEST SERPL-MCNC: 173 MG/DL
CHOLEST/HDLC SERPL: 2.7 (CALC)
CO2 SERPL-SCNC: 28 MMOL/L (ref 20–32)
CREAT SERPL-MCNC: 0.67 MG/DL (ref 0.5–1.05)
EGFRCR SERPLBLD CKD-EPI 2021: 96 ML/MIN/1.73M2
GLUCOSE SERPL-MCNC: 93 MG/DL (ref 65–99)
HDLC SERPL-MCNC: 63 MG/DL
LDLC SERPL CALC-MCNC: 87 MG/DL (CALC)
NONHDLC SERPL-MCNC: 110 MG/DL (CALC)
POC FINGERSTICK BLOOD GLUCOSE: 145 MG/DL (ref 70–100)
POTASSIUM SERPL-SCNC: 4.5 MMOL/L (ref 3.5–5.3)
PROT SERPL-MCNC: 7.4 G/DL (ref 6.1–8.1)
SODIUM SERPL-SCNC: 138 MMOL/L (ref 135–146)
TRIGL SERPL-MCNC: 125 MG/DL
TSH SERPL-ACNC: 3.35 MIU/L (ref 0.4–4.5)

## 2025-04-30 PROCEDURE — 82962 GLUCOSE BLOOD TEST: CPT | Performed by: NURSE PRACTITIONER

## 2025-04-30 PROCEDURE — 3079F DIAST BP 80-89 MM HG: CPT | Performed by: NURSE PRACTITIONER

## 2025-04-30 PROCEDURE — 1160F RVW MEDS BY RX/DR IN RCRD: CPT | Performed by: NURSE PRACTITIONER

## 2025-04-30 PROCEDURE — 3075F SYST BP GE 130 - 139MM HG: CPT | Performed by: NURSE PRACTITIONER

## 2025-04-30 PROCEDURE — 99214 OFFICE O/P EST MOD 30 MIN: CPT | Performed by: NURSE PRACTITIONER

## 2025-04-30 PROCEDURE — 1159F MED LIST DOCD IN RCRD: CPT | Performed by: NURSE PRACTITIONER

## 2025-04-30 PROCEDURE — 3044F HG A1C LEVEL LT 7.0%: CPT | Performed by: NURSE PRACTITIONER

## 2025-04-30 PROCEDURE — 3008F BODY MASS INDEX DOCD: CPT | Performed by: NURSE PRACTITIONER

## 2025-04-30 ASSESSMENT — ENCOUNTER SYMPTOMS
CONSTITUTIONAL NEGATIVE: 1
ENDOCRINE NEGATIVE: 1
RESPIRATORY NEGATIVE: 1
MUSCULOSKELETAL NEGATIVE: 1
GASTROINTESTINAL NEGATIVE: 1
NEUROLOGICAL NEGATIVE: 1
PSYCHIATRIC NEGATIVE: 1
CARDIOVASCULAR NEGATIVE: 1

## 2025-04-30 NOTE — PROGRESS NOTES
" Harriet Roth is a 69 y.o. here for a diabetic follow up exam.     Pt states they are doing well. Following a low carbohydrate diet and is active.     Up to date with eye and foot exams, pcp visits.     Pt was sched by pcp dr cobb to be shown how and to manage mounjaro - new med for pt    Scribed mounjaro 2.5mg weekly - no issues   She says constipation is a long hx for her    Start wt 209, bmi 35.87 and aic start of 6.2%     Hypothyroid - due for check pt knows to get labs done - she just got done this am.     Fbs 110, 101.   Very active and walking and swimming.   She continues to gain weight  Fried food makes her stomach upset  Her mom is elderly/stress.    She knows to drink her water 64+oz she will cut down on those.  She has some medical background    She will continue on mounjaro 2.5mg weekly for now as she is doing well overall.                   Lab Results   Component Value Date    POCGLU 145 (A) 04/30/2025    POCGLU 101 (A) 04/25/2025    HGBA1C 6.2 04/25/2025     /85   Pulse 80   Temp 36.2 °C (97.1 °F)   Ht 1.626 m (5' 4\")   Wt 91.6 kg (202 lb)   SpO2 96%   BMI 34.67 kg/m²    Wt Readings from Last 5 Encounters:   04/30/25 91.6 kg (202 lb)   04/25/25 94.8 kg (209 lb)   04/17/25 91.2 kg (201 lb 1 oz)   04/17/25 93.6 kg (206 lb 6.4 oz)   10/23/24 91.2 kg (201 lb)        No results found for: \"ALBUMINUR\", \"CREATU\", \"MICROALBCREA\"     Review of Systems   Constitutional: Negative.    HENT: Negative.     Respiratory: Negative.     Cardiovascular: Negative.    Gastrointestinal: Negative.    Endocrine: Negative.    Genitourinary: Negative.    Musculoskeletal: Negative.    Skin: Negative.    Neurological: Negative.    Psychiatric/Behavioral: Negative.          Physical Exam  Constitutional:       Appearance: Normal appearance.   HENT:      Head: Normocephalic.   Eyes:      Pupils: Pupils are equal, round, and reactive to light.   Pulmonary:      Effort: Pulmonary effort is normal.   Neurological:     "  General: No focal deficit present.      Mental Status: She is alert and oriented to person, place, and time. Mental status is at baseline.   Psychiatric:         Mood and Affect: Mood normal.         Behavior: Behavior normal.         Judgment: Judgment normal.        Problem List Items Addressed This Visit           ICD-10-CM    Hypothyroidism E03.9    IGT (impaired glucose tolerance) R73.02     Other Visit Diagnoses         Codes      Type 2 diabetes mellitus without complication, without long-term current use of insulin    -  Primary E11.9    Relevant Orders    POCT fingerstick glucose manually resulted (Completed)      Obesity (BMI 35.0-39.9 without comorbidity)     E66.9           Laura L Seaver, APRN-CNP

## 2025-05-01 DIAGNOSIS — E03.9 HYPOTHYROIDISM, UNSPECIFIED TYPE: ICD-10-CM

## 2025-05-01 DIAGNOSIS — E78.2 MIXED HYPERLIPIDEMIA: ICD-10-CM

## 2025-05-01 RX ORDER — ATORVASTATIN CALCIUM 10 MG/1
10 TABLET, FILM COATED ORAL DAILY
Qty: 90 TABLET | Refills: 1 | Status: SHIPPED | OUTPATIENT
Start: 2025-05-01

## 2025-05-02 ENCOUNTER — TELEPHONE (OUTPATIENT)
Dept: PRIMARY CARE | Facility: CLINIC | Age: 70
End: 2025-05-02
Payer: MEDICARE

## 2025-05-02 NOTE — TELEPHONE ENCOUNTER
----- Message from Amandeep Barrera sent at 5/2/2025  7:22 AM EDT -----  Labs are normal  ----- Message -----  From: Annabelle Guerrero LPN  Sent: 4/25/2025  11:28 AM EDT  To: Amandeep Barrera MD

## 2025-05-27 ASSESSMENT — ENCOUNTER SYMPTOMS
VISUAL CHANGE: 0
WEIGHT LOSS: 0
POLYPHAGIA: 0
SEIZURES: 0
NERVOUS/ANXIOUS: 0
DIZZINESS: 1
BLACKOUTS: 0
TREMORS: 0
BLURRED VISION: 0
SWEATS: 1
CONFUSION: 0
POLYDIPSIA: 0
SPEECH DIFFICULTY: 0
HEADACHES: 1
FATIGUE: 0
HUNGER: 0
WEAKNESS: 0

## 2025-05-30 ENCOUNTER — APPOINTMENT (OUTPATIENT)
Dept: PRIMARY CARE | Facility: CLINIC | Age: 70
End: 2025-05-30
Payer: COMMERCIAL

## 2025-05-30 VITALS
HEART RATE: 87 BPM | BODY MASS INDEX: 34.15 KG/M2 | HEIGHT: 64 IN | WEIGHT: 200 LBS | OXYGEN SATURATION: 97 % | DIASTOLIC BLOOD PRESSURE: 80 MMHG | TEMPERATURE: 97.6 F | SYSTOLIC BLOOD PRESSURE: 140 MMHG

## 2025-05-30 DIAGNOSIS — E03.9 ACQUIRED HYPOTHYROIDISM: ICD-10-CM

## 2025-05-30 DIAGNOSIS — E03.9 HYPOTHYROIDISM, UNSPECIFIED TYPE: ICD-10-CM

## 2025-05-30 DIAGNOSIS — E11.65 TYPE 2 DIABETES MELLITUS WITH HYPERGLYCEMIA, WITHOUT LONG-TERM CURRENT USE OF INSULIN: Primary | ICD-10-CM

## 2025-05-30 DIAGNOSIS — R29.818 ALTERATION IN HEARING STATUS: ICD-10-CM

## 2025-05-30 LAB
POC FINGERSTICK BLOOD GLUCOSE: 90 MG/DL (ref 70–100)
POC HEMOGLOBIN A1C: 5 % (ref 4.2–6.5)

## 2025-05-30 PROCEDURE — 3077F SYST BP >= 140 MM HG: CPT | Performed by: NURSE PRACTITIONER

## 2025-05-30 PROCEDURE — 83036 HEMOGLOBIN GLYCOSYLATED A1C: CPT | Performed by: NURSE PRACTITIONER

## 2025-05-30 PROCEDURE — 3008F BODY MASS INDEX DOCD: CPT | Performed by: NURSE PRACTITIONER

## 2025-05-30 PROCEDURE — 1160F RVW MEDS BY RX/DR IN RCRD: CPT | Performed by: NURSE PRACTITIONER

## 2025-05-30 PROCEDURE — 1036F TOBACCO NON-USER: CPT | Performed by: NURSE PRACTITIONER

## 2025-05-30 PROCEDURE — 3079F DIAST BP 80-89 MM HG: CPT | Performed by: NURSE PRACTITIONER

## 2025-05-30 PROCEDURE — 3044F HG A1C LEVEL LT 7.0%: CPT | Performed by: NURSE PRACTITIONER

## 2025-05-30 PROCEDURE — 1159F MED LIST DOCD IN RCRD: CPT | Performed by: NURSE PRACTITIONER

## 2025-05-30 PROCEDURE — 99214 OFFICE O/P EST MOD 30 MIN: CPT | Performed by: NURSE PRACTITIONER

## 2025-05-30 PROCEDURE — 82962 GLUCOSE BLOOD TEST: CPT | Performed by: NURSE PRACTITIONER

## 2025-05-30 RX ORDER — LEVOTHYROXINE SODIUM 100 UG/1
100 TABLET ORAL DAILY
Qty: 90 TABLET | Refills: 1 | Status: SHIPPED | OUTPATIENT
Start: 2025-05-30

## 2025-05-30 ASSESSMENT — ENCOUNTER SYMPTOMS
POLYPHAGIA: 0
WEAKNESS: 0
HEADACHES: 1
CONFUSION: 0
FATIGUE: 0
DIZZINESS: 1
POLYDIPSIA: 0
NERVOUS/ANXIOUS: 0
SPEECH DIFFICULTY: 0
TREMORS: 0
SEIZURES: 0

## 2025-05-30 ASSESSMENT — PATIENT HEALTH QUESTIONNAIRE - PHQ9
1. LITTLE INTEREST OR PLEASURE IN DOING THINGS: NOT AT ALL
2. FEELING DOWN, DEPRESSED OR HOPELESS: NOT AT ALL
SUM OF ALL RESPONSES TO PHQ9 QUESTIONS 1 AND 2: 0

## 2025-05-30 NOTE — PROGRESS NOTES
" Harriet Roth is a 69 y.o. here for a diabetic follow up exam.     Pt states they are doing well. Following a low carbohydrate diet and is active.     Up to date with eye and foot exams, pcp visits.     Struggled with wt gain  She has medical background  Exercising, etc  She is hypothyroid, on replacement. See last tsh level. We discussed this.   And pcp placed pt on mounjaro to assist    Last wt 202  - 200 today   Last bmi 34.67  Last aic 6.2% - 5% today   She is logging her carbs/food log.    Meds:  Mounjaro 2.5mg weekly. She is monitoring stools, hydrating 64+oz h20 daily rec. She is doing excellent on this dose no se and continues to be on it, not going to change it.     Answered on her ROS, positive for dizziness/headaches. Dw pcp and antibiotic didn't work. No pain. Has a recurrent clicking noise in left ear. Will have her seen ENT as the exam is benign.     Fatigued alot. On levo all days but hold on sundays. See tsh. Lets try adding back on 1/2 tab on sundays. Repeat tsh next ov.      Lab Results   Component Value Date    POCGLU 90 05/30/2025    POCGLU 145 (A) 04/30/2025    POCGLU 101 (A) 04/25/2025    HGBA1C 5.0 05/30/2025    HGBA1C 6.2 04/25/2025     /80   Pulse 87   Temp 36.4 °C (97.6 °F)   Ht 1.626 m (5' 4\")   Wt 90.7 kg (200 lb)   SpO2 97%   BMI 34.33 kg/m²    Wt Readings from Last 5 Encounters:   05/30/25 90.7 kg (200 lb)   04/30/25 91.6 kg (202 lb)   04/25/25 94.8 kg (209 lb)   04/17/25 91.2 kg (201 lb 1 oz)   04/17/25 93.6 kg (206 lb 6.4 oz)        No results found for: \"ALBUMINUR\", \"CREATU\", \"MICROALBCREA\"     Review of Systems   Constitutional:  Negative for fatigue.   Cardiovascular:  Negative for chest pain.   Endocrine: Negative for polydipsia, polyphagia and polyuria.   Skin:  Negative for pallor.   Neurological:  Positive for dizziness and headaches. Negative for tremors, seizures, speech difficulty and weakness.   Psychiatric/Behavioral:  Negative for confusion. The patient is " not nervous/anxious.         Physical Exam  Vitals and nursing note reviewed.   Constitutional:       Appearance: Normal appearance.   HENT:      Head: Normocephalic.      Right Ear: Tympanic membrane, ear canal and external ear normal.      Left Ear: Tympanic membrane, ear canal and external ear normal.   Eyes:      Pupils: Pupils are equal, round, and reactive to light.   Cardiovascular:      Rate and Rhythm: Normal rate and regular rhythm.      Heart sounds: Normal heart sounds.   Pulmonary:      Effort: Pulmonary effort is normal.      Breath sounds: Normal breath sounds.   Skin:     General: Skin is warm and dry.   Neurological:      General: No focal deficit present.      Mental Status: She is alert and oriented to person, place, and time. Mental status is at baseline.   Psychiatric:         Mood and Affect: Mood normal.         Behavior: Behavior normal.         Thought Content: Thought content normal.         Judgment: Judgment normal.     Answers submitted by the patient for this visit:  Diabetes Questionnaire (Submitted on 5/27/2025)  Chief Complaint: Diabetes problem  Diabetes type: type 2  MedicAlert ID: No  Disease duration: 1 Months  blurred vision: No  foot paresthesias: No  foot ulcerations: No  visual change: No  weight loss: No  Symptom course: worsening  hunger: No  mood changes: No  sleepiness: No  sweats: Yes  blackouts: No  hospitalization: No  nocturnal hypoglycemia: No  required assistance: No  required glucagon: No  CVA: No  heart disease: No  impotence: No  nephropathy: No  peripheral neuropathy: No  PVD: No  retinopathy: No  CAD risks: obesity, post-menopausal  Current treatments: diet, oral agent (monotherapy)  Treatment compliance: all of the time  breakfast time: 8-9 am  lunch time: 12-1 pm  dinner time: 5-6 pm  Bedtime: after 11 pm  Weight trend: decreasing steadily  Current diet: diabetic  Meal planning: avoidance of concentrated sweets, carbohydrate counting  Exercise:  weekly  Dietitian visit: No  Eye exam current: Yes  Sees podiatrist: No     Problem List Items Addressed This Visit           ICD-10-CM    Hypothyroidism E03.9    Relevant Medications    levothyroxine (Synthroid, Levoxyl) 100 mcg tablet    BMI 34.0-34.9,adult Z68.34     Other Visit Diagnoses         Codes      Type 2 diabetes mellitus with hyperglycemia, without long-term current use of insulin    -  Primary E11.65    Relevant Orders    POCT glycosylated hemoglobin (Hb A1C) manually resulted (Completed)    POCT fingerstick glucose manually resulted (Completed)    Albumin-Creatinine Ratio, Urine Random      Alteration in hearing status     R29.818    Relevant Orders    Referral to ENT           Laura L Seaver, APRN-CNP

## 2025-05-31 LAB
ALBUMIN/CREAT UR: ABNORMAL MG/G CREAT
CREAT UR-MCNC: 18 MG/DL (ref 20–275)
MICROALBUMIN UR-MCNC: <0.2 MG/DL

## 2025-06-27 ENCOUNTER — APPOINTMENT (OUTPATIENT)
Dept: PRIMARY CARE | Facility: CLINIC | Age: 70
End: 2025-06-27
Payer: MEDICARE

## 2025-06-27 VITALS
DIASTOLIC BLOOD PRESSURE: 86 MMHG | BODY MASS INDEX: 33.12 KG/M2 | WEIGHT: 194 LBS | TEMPERATURE: 97.5 F | OXYGEN SATURATION: 98 % | SYSTOLIC BLOOD PRESSURE: 137 MMHG | HEIGHT: 64 IN | HEART RATE: 86 BPM

## 2025-06-27 DIAGNOSIS — R73.02 IGT (IMPAIRED GLUCOSE TOLERANCE): ICD-10-CM

## 2025-06-27 DIAGNOSIS — E11.65 TYPE 2 DIABETES MELLITUS WITH HYPERGLYCEMIA, WITHOUT LONG-TERM CURRENT USE OF INSULIN: Primary | ICD-10-CM

## 2025-06-27 DIAGNOSIS — E03.9 ACQUIRED HYPOTHYROIDISM: ICD-10-CM

## 2025-06-27 LAB
POC FINGERSTICK BLOOD GLUCOSE: 102 MG/DL (ref 70–100)
POC HEMOGLOBIN A1C: 5.3 % (ref 4.2–6.5)

## 2025-06-27 PROCEDURE — 1036F TOBACCO NON-USER: CPT | Performed by: NURSE PRACTITIONER

## 2025-06-27 PROCEDURE — 3079F DIAST BP 80-89 MM HG: CPT | Performed by: NURSE PRACTITIONER

## 2025-06-27 PROCEDURE — 1160F RVW MEDS BY RX/DR IN RCRD: CPT | Performed by: NURSE PRACTITIONER

## 2025-06-27 PROCEDURE — 3008F BODY MASS INDEX DOCD: CPT | Performed by: NURSE PRACTITIONER

## 2025-06-27 PROCEDURE — 1159F MED LIST DOCD IN RCRD: CPT | Performed by: NURSE PRACTITIONER

## 2025-06-27 PROCEDURE — 83036 HEMOGLOBIN GLYCOSYLATED A1C: CPT | Performed by: NURSE PRACTITIONER

## 2025-06-27 PROCEDURE — 3075F SYST BP GE 130 - 139MM HG: CPT | Performed by: NURSE PRACTITIONER

## 2025-06-27 PROCEDURE — 99213 OFFICE O/P EST LOW 20 MIN: CPT | Performed by: NURSE PRACTITIONER

## 2025-06-27 PROCEDURE — 3044F HG A1C LEVEL LT 7.0%: CPT | Performed by: NURSE PRACTITIONER

## 2025-06-27 PROCEDURE — 82962 GLUCOSE BLOOD TEST: CPT | Performed by: NURSE PRACTITIONER

## 2025-06-27 RX ORDER — TIRZEPATIDE 2.5 MG/.5ML
2.5 INJECTION, SOLUTION SUBCUTANEOUS
Qty: 2 ML | Refills: 2 | Status: SHIPPED | OUTPATIENT
Start: 2025-06-29

## 2025-06-27 ASSESSMENT — ENCOUNTER SYMPTOMS
GASTROINTESTINAL NEGATIVE: 1
ENDOCRINE NEGATIVE: 1
CONSTITUTIONAL NEGATIVE: 1
RESPIRATORY NEGATIVE: 1
PSYCHIATRIC NEGATIVE: 1
MUSCULOSKELETAL NEGATIVE: 1
CARDIOVASCULAR NEGATIVE: 1
NEUROLOGICAL NEGATIVE: 1

## 2025-06-27 NOTE — PROGRESS NOTES
" Harriet Roth is a 69 y.o. here for a diabetic follow up exam.     Pt states they are doing well. Following a low carbohydrate diet and is active.     Up to date with eye and foot exams, pcp visits.     Drinking her water and doing well with that  Doing beautifully on still the lowest dose and will continue    Today 194 (200, 202, 207)  Her diet is pristine, low cho, protein counts    Meds:  Mounjaro 2.5mg weekly continues    Aic 5.3 (5.0)    All gi is ok for now, mild constipation but had before nothing worse     Doing ok on levothy    Coloncoscopy is upcoming will hold her mounjaro x1 week prior    Note b/p. We will monitor. Hope to watch it come down with continued wt loss  F/u 4 weeks         Patient was counseled and educated about blood glucose targets and aic goal, carb control and meal planning. weight loss. Be active.     patient will be sure to hydrate daily 64+oz of h20, and monitor stools. Call if any vomiting or intolerable side effects.      Lab Results   Component Value Date    POCGLU 102 (A) 06/27/2025    POCGLU 90 05/30/2025    POCGLU 145 (A) 04/30/2025    POCGLU 101 (A) 04/25/2025    HGBA1C 5.3 06/27/2025    HGBA1C 5.0 05/30/2025    HGBA1C 6.2 04/25/2025     /86   Pulse 86   Temp 36.4 °C (97.5 °F)   Ht 1.626 m (5' 4\")   Wt 88 kg (194 lb)   SpO2 98%   BMI 33.30 kg/m²    Wt Readings from Last 5 Encounters:   06/27/25 88 kg (194 lb)   05/30/25 90.7 kg (200 lb)   04/30/25 91.6 kg (202 lb)   04/25/25 94.8 kg (209 lb)   04/17/25 91.2 kg (201 lb 1 oz)          Lab Results   Component Value Date    ALBUMINUR <0.2 05/30/2025    CREATU 18 (L) 05/30/2025    MICROALBCREA NOTE 05/30/2025        Review of Systems   Constitutional: Negative.    HENT: Negative.     Respiratory: Negative.     Cardiovascular: Negative.    Gastrointestinal: Negative.    Endocrine: Negative.    Genitourinary: Negative.    Musculoskeletal: Negative.    Skin: Negative.    Neurological: Negative.    Psychiatric/Behavioral: " Negative.          Physical Exam  Vitals and nursing note reviewed.   Constitutional:       Appearance: Normal appearance.   HENT:      Head: Normocephalic.   Eyes:      Pupils: Pupils are equal, round, and reactive to light.   Cardiovascular:      Rate and Rhythm: Normal rate and regular rhythm.      Heart sounds: Normal heart sounds.   Pulmonary:      Effort: Pulmonary effort is normal.      Breath sounds: Normal breath sounds.   Skin:     General: Skin is warm and dry.   Neurological:      General: No focal deficit present.      Mental Status: She is alert and oriented to person, place, and time. Mental status is at baseline.   Psychiatric:         Mood and Affect: Mood normal.         Behavior: Behavior normal.         Thought Content: Thought content normal.         Judgment: Judgment normal.          Assessment & Plan  Type 2 diabetes mellitus with hyperglycemia, without long-term current use of insulin    Orders:    POCT glycosylated hemoglobin (Hb A1C) manually resulted    POCT fingerstick glucose manually resulted    IGT (impaired glucose tolerance)    Orders:    tirzepatide (Mounjaro) 2.5 mg/0.5 mL pen injector; Inject 2.5 mg under the skin 1 (one) time per week.    Acquired hypothyroidism         BMI 33.0-33.9,adult              Laura L Seaver, APRN-CNP     I spent a total of documented minutes on the date of service which included preparing to see the patient, face-to-face patient care, completing clinical documentation. Obtained and/or reviewed separately obtained history, counseling and education the patient/family/caregiver, and ordering medications, refills, tests, procedure or consults to specialists.

## 2025-07-23 ENCOUNTER — APPOINTMENT (OUTPATIENT)
Dept: PRIMARY CARE | Facility: CLINIC | Age: 70
End: 2025-07-23
Payer: COMMERCIAL

## 2025-07-23 VITALS
WEIGHT: 189 LBS | DIASTOLIC BLOOD PRESSURE: 84 MMHG | BODY MASS INDEX: 32.27 KG/M2 | RESPIRATION RATE: 16 BRPM | TEMPERATURE: 97.5 F | SYSTOLIC BLOOD PRESSURE: 143 MMHG | HEART RATE: 76 BPM | HEIGHT: 64 IN

## 2025-07-23 DIAGNOSIS — R73.02 IGT (IMPAIRED GLUCOSE TOLERANCE): ICD-10-CM

## 2025-07-23 DIAGNOSIS — E03.9 ACQUIRED HYPOTHYROIDISM: ICD-10-CM

## 2025-07-23 DIAGNOSIS — E11.65 TYPE 2 DIABETES MELLITUS WITH HYPERGLYCEMIA, WITHOUT LONG-TERM CURRENT USE OF INSULIN: Primary | ICD-10-CM

## 2025-07-23 LAB
POC FINGERSTICK BLOOD GLUCOSE: 103 MG/DL (ref 70–100)
POC HEMOGLOBIN A1C: 5.4 % (ref 4.2–6.5)

## 2025-07-23 PROCEDURE — 1159F MED LIST DOCD IN RCRD: CPT | Performed by: NURSE PRACTITIONER

## 2025-07-23 PROCEDURE — 99213 OFFICE O/P EST LOW 20 MIN: CPT | Performed by: NURSE PRACTITIONER

## 2025-07-23 PROCEDURE — 82962 GLUCOSE BLOOD TEST: CPT | Performed by: NURSE PRACTITIONER

## 2025-07-23 PROCEDURE — 3008F BODY MASS INDEX DOCD: CPT | Performed by: NURSE PRACTITIONER

## 2025-07-23 PROCEDURE — 1160F RVW MEDS BY RX/DR IN RCRD: CPT | Performed by: NURSE PRACTITIONER

## 2025-07-23 PROCEDURE — 3079F DIAST BP 80-89 MM HG: CPT | Performed by: NURSE PRACTITIONER

## 2025-07-23 PROCEDURE — 1036F TOBACCO NON-USER: CPT | Performed by: NURSE PRACTITIONER

## 2025-07-23 PROCEDURE — 83036 HEMOGLOBIN GLYCOSYLATED A1C: CPT | Performed by: NURSE PRACTITIONER

## 2025-07-23 PROCEDURE — 3044F HG A1C LEVEL LT 7.0%: CPT | Performed by: NURSE PRACTITIONER

## 2025-07-23 PROCEDURE — 3077F SYST BP >= 140 MM HG: CPT | Performed by: NURSE PRACTITIONER

## 2025-07-23 ASSESSMENT — ENCOUNTER SYMPTOMS
CARDIOVASCULAR NEGATIVE: 1
MUSCULOSKELETAL NEGATIVE: 1
PSYCHIATRIC NEGATIVE: 1
RESPIRATORY NEGATIVE: 1
NEUROLOGICAL NEGATIVE: 1
CONSTITUTIONAL NEGATIVE: 1
GASTROINTESTINAL NEGATIVE: 1
ENDOCRINE NEGATIVE: 1

## 2025-07-23 NOTE — PROGRESS NOTES
" Harriet Roth is a 69 y.o. here for a diabetic follow up exam.     Pt states they are doing well. Following a low carbohydrate diet and is active.     Up to date with eye and foot exams, pcp visits.     Aic 5.4% (5.3, 5.0, 6.2%)  She is doing beautifully  Following the diet plan  Down another 5 lbs  Diet is pristine - getting in the bprotein and water  Some constipation - taking fiber - benefiber for bloating and will try smooth move tea. No pain, no bleeding etc. N/v    Fbs - 109  1hr pp - 120  2hr - 113  Noon 96  Meds:  Mounjaro 2.5mg weekly   Will stay on this dose. She does not feel need to advance  F/u 3months - she will be out of town helping her daughter with house repairs/new home.         Patient was counseled and educated about blood glucose targets and aic goal, carb control and meal planning. patient will be sure to hydrate daily 64+oz of h20, and monitor stools. Call if any vomiting or intolerable side effects.      Lab Results   Component Value Date    POCGLU 103 (A) 07/23/2025    POCGLU 102 (A) 06/27/2025    POCGLU 90 05/30/2025    POCGLU 145 (A) 04/30/2025    POCGLU 101 (A) 04/25/2025    HGBA1C 5.4 07/23/2025    HGBA1C 5.3 06/27/2025    HGBA1C 5.0 05/30/2025    HGBA1C 6.2 04/25/2025     /84   Pulse 76   Temp 36.4 °C (97.5 °F) (Temporal)   Resp 16   Ht 1.626 m (5' 4\")   Wt 85.7 kg (189 lb)   BMI 32.44 kg/m²    Wt Readings from Last 5 Encounters:   07/23/25 85.7 kg (189 lb)   06/27/25 88 kg (194 lb)   05/30/25 90.7 kg (200 lb)   04/30/25 91.6 kg (202 lb)   04/25/25 94.8 kg (209 lb)          Lab Results   Component Value Date    ALBUMINUR <0.2 05/30/2025    CREATU 18 (L) 05/30/2025    MICROALBCREA NOTE 05/30/2025        Review of Systems   Constitutional: Negative.    HENT: Negative.     Respiratory: Negative.     Cardiovascular: Negative.    Gastrointestinal: Negative.    Endocrine: Negative.    Genitourinary: Negative.    Musculoskeletal: Negative.    Skin: Negative.    Neurological: " Negative.    Psychiatric/Behavioral: Negative.          Physical Exam  Vitals and nursing note reviewed.   Constitutional:       Appearance: Normal appearance.   HENT:      Head: Normocephalic.     Eyes:      Pupils: Pupils are equal, round, and reactive to light.       Cardiovascular:      Rate and Rhythm: Normal rate and regular rhythm.      Heart sounds: Normal heart sounds.   Pulmonary:      Effort: Pulmonary effort is normal.      Breath sounds: Normal breath sounds.     Skin:     General: Skin is warm and dry.     Neurological:      General: No focal deficit present.      Mental Status: She is alert and oriented to person, place, and time. Mental status is at baseline.     Psychiatric:         Mood and Affect: Mood normal.         Behavior: Behavior normal.         Thought Content: Thought content normal.         Judgment: Judgment normal.          Assessment & Plan  Type 2 diabetes mellitus with hyperglycemia, without long-term current use of insulin    Orders:    POCT glycosylated hemoglobin (Hb A1C) manually resulted    POCT fingerstick glucose manually resulted    Albumin-Creatinine Ratio, Urine Random; Future    BMI 33.0-33.9,adult         Acquired hypothyroidism    Orders:    Tsh With Reflex To Free T4 If Abnormal; Future    Albumin-Creatinine Ratio, Urine Random; Future    IGT (impaired glucose tolerance)              Laura L Seaver, APRN-CNP     I spent a total of documented minutes on the date of service which included preparing to see the patient, face-to-face patient care, completing clinical documentation. Obtained and/or reviewed separately obtained history, counseling and education the patient/family/caregiver, and ordering medications, refills, tests, procedure or consults to specialists.

## 2025-07-23 NOTE — ASSESSMENT & PLAN NOTE
Orders:    Tsh With Reflex To Free T4 If Abnormal; Future    Albumin-Creatinine Ratio, Urine Random; Future

## 2025-08-01 LAB
ALBUMIN/CREAT UR: NORMAL
CREAT UR-MCNC: NORMAL MG/DL
MICROALBUMIN UR-MCNC: NORMAL
TSH SERPL-ACNC: 2.01 MIU/L (ref 0.4–4.5)

## 2025-08-04 LAB
ALBUMIN/CREAT UR: 6 MG/G CREAT
CREAT UR-MCNC: 51 MG/DL (ref 20–275)
MICROALBUMIN UR-MCNC: 0.3 MG/DL
TSH SERPL-ACNC: 2.01 MIU/L (ref 0.4–4.5)

## 2025-10-27 ENCOUNTER — APPOINTMENT (OUTPATIENT)
Dept: PRIMARY CARE | Facility: CLINIC | Age: 70
End: 2025-10-27
Payer: COMMERCIAL

## 2025-10-28 ENCOUNTER — APPOINTMENT (OUTPATIENT)
Dept: PRIMARY CARE | Facility: CLINIC | Age: 70
End: 2025-10-28
Payer: COMMERCIAL

## 2025-10-29 ENCOUNTER — APPOINTMENT (OUTPATIENT)
Dept: PRIMARY CARE | Facility: CLINIC | Age: 70
End: 2025-10-29
Payer: COMMERCIAL